# Patient Record
Sex: MALE | Race: AMERICAN INDIAN OR ALASKA NATIVE
[De-identification: names, ages, dates, MRNs, and addresses within clinical notes are randomized per-mention and may not be internally consistent; named-entity substitution may affect disease eponyms.]

---

## 2018-05-03 NOTE — EMERGENCY DEPARTMENT REPORT
Chief Complaint: Urogenital-Male


Stated Complaint: STD CHECK


Time Seen by Provider: 05/03/18 14:28





- HPI


History of Present Illness: 





Patient is a 27-year-old -American male who is presenting with exposure 

to STD.  Patient states his wife was diagnosed with chlamydia.  Patient has no 

symptoms himself at this time.  The patient just wanted to get a checkup.  





- ROS


Review of Systems: 





All systems negative except for those in HPI





- Exam


Vital Signs: 


 Vital Signs











  05/03/18





  13:45


 


Temperature 98.0 F


 


Pulse Rate 80


 


Respiratory 16





Rate 


 


Blood Pressure 125/73


 


O2 Sat by Pulse 99





Oximetry 











Physical Exam: 





Abdomen soft nontender


MSE screening note: 


Focused history and physical exam performed.


Due to findings the following was ordered:











ED Disposition for MSE


Clinical Impression: 


 STD exposure





Disposition: Z-07 MED SCREENING EXAM-LEFT


Condition: Stable


Referrals: 


PRIMARY CARE,MD [Primary Care Provider] - 3-5 Days

## 2019-01-23 ENCOUNTER — HOSPITAL ENCOUNTER (INPATIENT)
Dept: HOSPITAL 5 - ED | Age: 29
LOS: 7 days | Discharge: HOME | DRG: 74 | End: 2019-01-30
Attending: INTERNAL MEDICINE | Admitting: INTERNAL MEDICINE
Payer: SELF-PAY

## 2019-01-23 DIAGNOSIS — Z79.4: ICD-10-CM

## 2019-01-23 DIAGNOSIS — Z71.89: ICD-10-CM

## 2019-01-23 DIAGNOSIS — Z83.3: ICD-10-CM

## 2019-01-23 DIAGNOSIS — J45.909: ICD-10-CM

## 2019-01-23 DIAGNOSIS — E10.10: ICD-10-CM

## 2019-01-23 DIAGNOSIS — Z88.8: ICD-10-CM

## 2019-01-23 DIAGNOSIS — Z76.5: ICD-10-CM

## 2019-01-23 DIAGNOSIS — E10.43: Primary | ICD-10-CM

## 2019-01-23 DIAGNOSIS — K31.84: ICD-10-CM

## 2019-01-23 LAB
ALBUMIN SERPL-MCNC: 4.9 G/DL (ref 3.9–5)
ALT SERPL-CCNC: 19 UNITS/L (ref 7–56)
BASOPHILS # (AUTO): 0 K/MM3 (ref 0–0.1)
BASOPHILS NFR BLD AUTO: 0.2 % (ref 0–1.8)
BILIRUB DIRECT SERPL-MCNC: 0.2 MG/DL (ref 0–0.2)
BILIRUB UR QL STRIP: (no result)
BLOOD UR QL VISUAL: (no result)
BUN SERPL-MCNC: 15 MG/DL (ref 9–20)
BUN SERPL-MCNC: 16 MG/DL (ref 9–20)
BUN SERPL-MCNC: 16 MG/DL (ref 9–20)
BUN SERPL-MCNC: 17 MG/DL (ref 9–20)
BUN/CREAT SERPL: 12 %
BUN/CREAT SERPL: 12 %
BUN/CREAT SERPL: 13 %
BUN/CREAT SERPL: 13 %
CALCIUM SERPL-MCNC: 10 MG/DL (ref 8.4–10.2)
CALCIUM SERPL-MCNC: 9.3 MG/DL (ref 8.4–10.2)
CALCIUM SERPL-MCNC: 9.3 MG/DL (ref 8.4–10.2)
CALCIUM SERPL-MCNC: 9.4 MG/DL (ref 8.4–10.2)
EOSINOPHIL # BLD AUTO: 0 K/MM3 (ref 0–0.4)
EOSINOPHIL NFR BLD AUTO: 0 % (ref 0–4.3)
HCT VFR BLD CALC: 45.7 % (ref 35.5–45.6)
HEMOLYSIS INDEX: 22
HEMOLYSIS INDEX: 25
HEMOLYSIS INDEX: 5
HEMOLYSIS INDEX: 6
HGB BLD-MCNC: 15 GM/DL (ref 11.8–15.2)
LYMPHOCYTES # BLD AUTO: 0.8 K/MM3 (ref 1.2–5.4)
LYMPHOCYTES NFR BLD AUTO: 9.1 % (ref 13.4–35)
MCHC RBC AUTO-ENTMCNC: 33 % (ref 32–34)
MCV RBC AUTO: 92 FL (ref 84–94)
MONOCYTES # (AUTO): 0.5 K/MM3 (ref 0–0.8)
MONOCYTES % (AUTO): 5.8 % (ref 0–7.3)
MUCOUS THREADS #/AREA URNS HPF: (no result) /HPF
PH UR STRIP: 5 [PH] (ref 5–7)
PLATELET # BLD: 243 K/MM3 (ref 140–440)
PROT UR STRIP-MCNC: (no result) MG/DL
RBC # BLD AUTO: 4.99 M/MM3 (ref 3.65–5.03)
RBC #/AREA URNS HPF: 3 /HPF (ref 0–6)
UROBILINOGEN UR-MCNC: < 2 MG/DL (ref ?–2)
WBC #/AREA URNS HPF: 2 /HPF (ref 0–6)

## 2019-01-23 PROCEDURE — 80053 COMPREHEN METABOLIC PANEL: CPT

## 2019-01-23 PROCEDURE — 83735 ASSAY OF MAGNESIUM: CPT

## 2019-01-23 PROCEDURE — 81001 URINALYSIS AUTO W/SCOPE: CPT

## 2019-01-23 PROCEDURE — 84132 ASSAY OF SERUM POTASSIUM: CPT

## 2019-01-23 PROCEDURE — C9113 INJ PANTOPRAZOLE SODIUM, VIA: HCPCS

## 2019-01-23 PROCEDURE — 82962 GLUCOSE BLOOD TEST: CPT

## 2019-01-23 PROCEDURE — 85025 COMPLETE CBC W/AUTO DIFF WBC: CPT

## 2019-01-23 PROCEDURE — 80076 HEPATIC FUNCTION PANEL: CPT

## 2019-01-23 PROCEDURE — 82805 BLOOD GASES W/O2 SATURATION: CPT

## 2019-01-23 PROCEDURE — 71045 X-RAY EXAM CHEST 1 VIEW: CPT

## 2019-01-23 PROCEDURE — 78264 GASTRIC EMPTYING IMG STUDY: CPT

## 2019-01-23 PROCEDURE — 80048 BASIC METABOLIC PNL TOTAL CA: CPT

## 2019-01-23 PROCEDURE — 36415 COLL VENOUS BLD VENIPUNCTURE: CPT

## 2019-01-23 PROCEDURE — 74018 RADEX ABDOMEN 1 VIEW: CPT

## 2019-01-23 PROCEDURE — 84100 ASSAY OF PHOSPHORUS: CPT

## 2019-01-23 PROCEDURE — 83036 HEMOGLOBIN GLYCOSYLATED A1C: CPT

## 2019-01-23 PROCEDURE — A9541 TC99M SULFUR COLLOID: HCPCS

## 2019-01-23 RX ADMIN — ENOXAPARIN SODIUM SCH MG: 100 INJECTION SUBCUTANEOUS at 21:49

## 2019-01-23 RX ADMIN — ONDANSETRON PRN MG: 2 INJECTION INTRAMUSCULAR; INTRAVENOUS at 18:18

## 2019-01-23 RX ADMIN — INSULIN HUMAN SCH MLS/HR: 100 INJECTION, SOLUTION PARENTERAL at 22:20

## 2019-01-23 RX ADMIN — INSULIN HUMAN SCH MLS/HR: 100 INJECTION, SOLUTION PARENTERAL at 11:14

## 2019-01-23 RX ADMIN — DEXTROSE AND SODIUM CHLORIDE SCH MLS/HR: 5; .45 INJECTION, SOLUTION INTRAVENOUS at 18:59

## 2019-01-23 NOTE — HISTORY AND PHYSICAL REPORT
History of Present Illness


Date of admission: 


01/23/19 09:48





Chief complaint: 





High blood glucose


History of present illness: 





28-year-old man with history of type 1 diabetes.  He presents with nausea 

vomiting and elevated glucose.  not able to tolerate anything by mouth, 

therefore unable to take his insulin, has not able to stay hydrated with water 

as he is nauseous.  He denies nonadherence to his medications, he states that he

takes his insulins regularly.  He denies dysuria, cough, fevers or chills





Past Medical History: diabetes


Past Surgical History: No surgical history


Social history: lives with family.  denies: smoking, alcohol abuse


Family history: diabetes





Medications and Allergies


                                    Allergies











Allergy/AdvReac Type Severity Reaction Status Date / Time


 


metoclopramide [From Reglan] AdvReac  panic Verified 01/23/19 11:53





   attack  











                                Home Medications











 Medication  Instructions  Recorded  Confirmed  Last Taken  Type


 


Insulin Glargine,Hum.rec.anlog 20 units SUB-Q HS 01/23/19 01/23/19 Unknown 

History





[Lantus]     


 


Insulin Lispro [HumaLOG VIAL] 10 units SUB-Q AC 01/23/19 01/23/19 Unknown 

History











Active Meds: 


Active Medications





Dextrose (D50w (25gm) Syringe)  0 ml IV PRN PRN


   PRN Reason: Hypoglycemia


Insulin Human Regular 100 (units/ Sodium Chloride)  100 mls @ 1 mls/hr IV TITR 

JADEN; Protocol


   Last Admin: 01/23/19 11:14 Dose:  7 units/hr, 7 mls/hr


   Documented by: 











Review of Systems


All systems: negative


Constitutional: fatigue


Eyes: bilateral: blurred vision


Ears, nose, mouth and throat: no ear pain


Cardiovascular: no chest pain


Respiratory: no cough


Gastrointestinal: nausea, vomiting


Genitourinary Male: no dysuria


Rectal: no pain


Musculoskeletal: no neck stiffness


Integumentary: no rash


Neurological: no head injury


Psychiatric: no anxiety


Endocrine: no cold intolerance


Hematologic/Lymphatic: no easy bruising


Allergic/Immunologic: no urticaria





Exam





- Constitutional


Vitals: 


                                        











Temp Pulse Resp BP Pulse Ox


 


 98 F   100 H  22   109/52   98 


 


 01/23/19 11:00  01/23/19 11:30  01/23/19 11:30  01/23/19 11:30  01/23/19 11:30











General appearance: Present: mild distress





- EENT


Eyes: Present: PERRL


ENT: hearing intact, clear oral mucosa (dry)





- Neck


Neck: Present: supple, normal ROM





- Respiratory


Respiratory effort: normal


Respiratory: bilateral: CTA





- Cardiovascular


Heart Sounds: Present: S1 & S2.  Absent: rub, click





- Extremities


Extremities: pulses symmetrical, No edema


Peripheral Pulses: within normal limits





- Abdominal


General gastrointestinal: Present: soft, non-tender, non-distended, normal bowel

sounds


Male genitourinary: Present: normal





- Integumentary


Integumentary: Present: clear, warm, dry





- Musculoskeletal


Musculoskeletal: gait normal, strength equal bilaterally





- Psychiatric


Psychiatric: appropriate mood/affect, intact judgment & insight





- Neurologic


Neurologic: CNII-XII intact, moves all extremities





Results





- Labs


CBC & Chem 7: 


                                 01/23/19 08:58





                                 01/24/19 09:42


Labs: 


                             Laboratory Last Values











WBC  9.2 K/mm3 (4.5-11.0)   01/23/19  08:58    


 


RBC  4.99 M/mm3 (3.65-5.03)   01/23/19  08:58    


 


Hgb  15.0 gm/dl (11.8-15.2)   01/23/19  08:58    


 


Hct  45.7 % (35.5-45.6)  H  01/23/19  08:58    


 


MCV  92 fl (84-94)   01/23/19  08:58    


 


MCH  30 pg (28-32)   01/23/19  08:58    


 


MCHC  33 % (32-34)   01/23/19  08:58    


 


RDW  12.5 % (13.2-15.2)  L  01/23/19  08:58    


 


Plt Count  243 K/mm3 (140-440)   01/23/19  08:58    


 


Lymph % (Auto)  9.1 % (13.4-35.0)  L  01/23/19  08:58    


 


Mono % (Auto)  5.8 % (0.0-7.3)   01/23/19  08:58    


 


Eos % (Auto)  0.0 % (0.0-4.3)   01/23/19  08:58    


 


Baso % (Auto)  0.2 % (0.0-1.8)   01/23/19  08:58    


 


Lymph #  0.8 K/mm3 (1.2-5.4)  L  01/23/19  08:58    


 


Mono #  0.5 K/mm3 (0.0-0.8)   01/23/19  08:58    


 


Eos #  0.0 K/mm3 (0.0-0.4)   01/23/19  08:58    


 


Baso #  0.0 K/mm3 (0.0-0.1)   01/23/19  08:58    


 


Seg Neutrophils %  84.9 % (40.0-70.0)  H  01/23/19  08:58    


 


Seg Neutrophils #  7.8 K/mm3 (1.8-7.7)  H  01/23/19  08:58    


 


VBG pH  7.235  (7.320-7.420)  L  01/23/19  08:58    


 


Sodium  134 mmol/L (137-145)  L  01/23/19  08:58    


 


Potassium  4.7 mmol/L (3.6-5.0)   01/23/19  08:58    


 


Chloride  90.2 mmol/L ()  L  01/23/19  08:58    


 


Carbon Dioxide  12 mmol/L (22-30)  L  01/23/19  08:58    


 


Anion Gap  37 mmol/L  01/23/19  08:58    


 


BUN  15 mg/dL (9-20)   01/23/19  08:58    


 


Creatinine  1.3 mg/dL (0.8-1.5)   01/23/19  08:58    


 


Estimated GFR  > 60 ml/min  01/23/19  08:58    


 


BUN/Creatinine Ratio  12 %  01/23/19  08:58    


 


Glucose  471 mg/dL ()  H  01/23/19  08:58    


 


POC Glucose  393  ()  H  01/23/19  10:51    


 


Calcium  10.0 mg/dL (8.4-10.2)   01/23/19  08:58    


 


Phosphorus  5.20 mg/dL (2.5-4.5)  H  01/23/19  08:58    


 


Magnesium  1.70 mg/dL (1.7-2.3)   01/23/19  08:58    


 


Total Bilirubin  0.70 mg/dL (0.1-1.2)   01/23/19  08:58    


 


Direct Bilirubin  0.2 mg/dL (0-0.2)   01/23/19  08:58    


 


Indirect Bilirubin  0.5 mg/dL  01/23/19  08:58    


 


AST  14 units/L (5-40)   01/23/19  08:58    


 


ALT  19 units/L (7-56)   01/23/19  08:58    


 


Alkaline Phosphatase  86 units/L ()   01/23/19  08:58    


 


Total Protein  8.4 g/dL (6.3-8.2)  H  01/23/19  08:58    


 


Albumin  4.9 g/dL (3.9-5)   01/23/19  08:58    


 


Albumin/Globulin Ratio  1.4 %  01/23/19  08:58    


 


Urine Color  Straw  (Yellow)   01/23/19  10:55    


 


Urine Turbidity  Clear  (Clear)   01/23/19  10:55    


 


Urine pH  5.0  (5.0-7.0)   01/23/19  10:55    


 


Ur Specific Gravity  1.024  (1.003-1.030)   01/23/19  10:55    


 


Urine Protein  <15 mg/dl mg/dL (Negative)   01/23/19  10:55    


 


Urine Glucose (UA)  >=500 mg/dL (Negative)   01/23/19  10:55    


 


Urine Ketones  80 mg/dL (Negative)   01/23/19  10:55    


 


Urine Blood  Neg  (Negative)   01/23/19  10:55    


 


Urine Nitrite  Neg  (Negative)   01/23/19  10:55    


 


Urine Bilirubin  Neg  (Negative)   01/23/19  10:55    


 


Urine Urobilinogen  < 2.0 mg/dL (<2.0)   01/23/19  10:55    


 


Ur Leukocyte Esterase  Neg  (Negative)   01/23/19  10:55    


 


Urine WBC (Auto)  2.0 /HPF (0.0-6.0)   01/23/19  10:55    


 


Urine RBC (Auto)  3.0 /HPF (0.0-6.0)   01/23/19  10:55    


 


Urine Mucus  Few /HPF  01/23/19  10:55    














- Imaging and Cardiology


Chest x-ray: image reviewed (no acute findings)





Assessment and Plan


Assessment and plan: 





28-year-old man with type 1 diabetes, presents with nausea vomiting and elevated

glucose





Problems


DKA


Type 1 diabetes


Intractable nausea and vomiting





Plan


Admit to ICU, insulin drip, IV fluids


-Antiemetics as needed, obtain A1c


-DVT prophylaxis chemical





Critical care time 35 minutes

## 2019-01-23 NOTE — XRAY REPORT
AP CHEST :01/23/19 08:39:00



CLINICAL: DKA



COMPARISON:05/31/15



FINDINGS: Normal heart and pulmonary vasculature.  The lungs are mildly 

hyperinflated.  No airspace disease or pleural effusion.  The bones and 

soft tissues are normal.No tubes or lines.



IMPRESSION: Pulmonary hyperinflation and otherwise normal.  No 

pneumonia.

## 2019-01-23 NOTE — EMERGENCY DEPARTMENT REPORT
ED General Adult HPI





- General


Chief complaint: Nausea/Vomiting/Diarrhea


Stated complaint: HIGH BLOOD SUGAR


Time Seen by Provider: 01/23/19 09:03


Source: patient


Mode of arrival: Ambulatory


Limitations: No Limitations





- History of Present Illness


Initial comments: 





Patient is 28 years old male with history of type 1 diabetes on insulin.  

Patient is noncompliant with his medication.  Patient presented to the emergency

room complaining of nausea vomiting for the last 3 days.  Patient denied any 

chest pain, abdominal pain, diarrhea or urinary symptoms.





- Related Data


                                Home Medications











 Medication  Instructions  Recorded  Confirmed  Last Taken


 


Insulin Glargine,Hum.rec.anlog 20 units SUB-Q HS 01/23/19 01/23/19 Unknown





[Lantus]    


 


Insulin Lispro [HumaLOG VIAL] 10 units SUB-Q AC 01/23/19 01/23/19 Unknown











                                    Allergies











Allergy/AdvReac Type Severity Reaction Status Date / Time


 


metoclopramide [From Reglan] AdvReac  panic Verified 01/23/19 11:53





   attack  














ED Review of Systems


ROS: 


Stated complaint: HIGH BLOOD SUGAR


Other details as noted in HPI





Comment: All other systems reviewed and negative


Constitutional: denies: chills, fever


Respiratory: denies: cough, orthopnea


Cardiovascular: denies: chest pain, palpitations, dyspnea on exertion


Gastrointestinal: nausea, vomiting.  denies: abdominal pain, diarrhea, cons

tipation, hematemesis, melena, hematochezia


Musculoskeletal: denies: back pain


Neurological: denies: headache, weakness, numbness, paresthesias, confusion, 

abnormal gait





ED Past Medical Hx





- Past Medical History


Hx Diabetes: Yes


Hx Asthma: Yes





- Social History


Smoking Status: Never Smoker


Substance Use Type: None





- Medications


Home Medications: 


                                Home Medications











 Medication  Instructions  Recorded  Confirmed  Last Taken  Type


 


Insulin Glargine,Hum.rec.anlog 20 units SUB-Q HS 01/23/19 01/23/19 Unknown 

History





[Lantus]     


 


Insulin Lispro [HumaLOG VIAL] 10 units SUB-Q AC 01/23/19 01/23/19 Unknown 

History














ED Physical Exam





- General


Limitations: No Limitations


General appearance: alert, in no apparent distress





- Head


Head exam: Present: atraumatic, normocephalic, normal inspection





- Eye


Eye exam: Present: normal appearance





- ENT


ENT exam: Present: mucous membranes dry





- Neck


Neck exam: Present: normal inspection, full ROM.  Absent: tenderness, 

meningismus, lymphadenopathy, thyromegaly





- Respiratory


Respiratory exam: Present: normal lung sounds bilaterally





- Cardiovascular


Cardiovascular Exam: Present: regular rate, normal rhythm, normal heart sounds





- GI/Abdominal


GI/Abdominal exam: Present: soft, normal bowel sounds.  Absent: distended, 

tenderness, guarding, rebound, rigid, organomegaly, mass, bruit, pulsatile mass,

hernia





- Extremities Exam


Extremities exam: Present: normal inspection, full ROM, normal capillary refill.

 Absent: pedal edema, calf tenderness





- Back Exam


Back exam: Present: normal inspection, full ROM.  Absent: tenderness, CVA 

tenderness (R), CVA tenderness (L), muscle spasm, paraspinal tenderness, 

vertebral tenderness





- Neurological Exam


Neurological exam: Present: alert, oriented X3, CN II-XII intact, normal gait, 

reflexes normal





- Skin


Skin exam: Present: warm, intact, normal color





ED Course


                                   Vital Signs











  01/23/19 01/23/19 01/23/19





  08:48 09:05 09:15


 


Temperature 97.3 F L  


 


Pulse Rate 101 H  95 H


 


Respiratory 18  17





Rate   


 


Blood Pressure 127/83  118/63


 


O2 Sat by Pulse 100 97 





Oximetry   














  01/23/19 01/23/19 01/23/19





  09:27 09:30 09:45


 


Temperature 97.5 F L  


 


Pulse Rate  92 H 97 H


 


Respiratory  16 14





Rate   


 


Blood Pressure  126/66 103/57


 


O2 Sat by Pulse  97 77 L





Oximetry   














  01/23/19 01/23/19 01/23/19





  10:00 10:15 10:30


 


Temperature   


 


Pulse Rate 94 H 99 H 100 H


 


Respiratory 14 21 19





Rate   


 


Blood Pressure 115/64 109/52 117/59


 


O2 Sat by Pulse 100 100 100





Oximetry   














  01/23/19 01/23/19 01/23/19





  10:45 11:00 11:15


 


Temperature  98 F 


 


Pulse Rate 103 H 104 H 107 H


 


Respiratory 16 16 15





Rate   


 


Blood Pressure 107/54 108/51 103/48


 


O2 Sat by Pulse 99 99 97





Oximetry   














  01/23/19 01/23/19 01/23/19





  11:30 11:50 12:00


 


Temperature   


 


Pulse Rate 100 H 95 H 90


 


Respiratory 22 19 16





Rate   


 


Blood Pressure 109/52 97/53 97/53


 


O2 Sat by Pulse 98 98 100





Oximetry   














  01/23/19 01/23/19 01/23/19





  12:10 12:20 12:30


 


Temperature   


 


Pulse Rate 116 H 95 H 98 H


 


Respiratory 19 20 15





Rate   


 


Blood Pressure 130/66 130/66 130/66


 


O2 Sat by Pulse 98 99 99





Oximetry   














ED Medical Decision Making





- Lab Data


Result diagrams: 


                                 01/23/19 08:58





                                 01/23/19 13:44





- Medical Decision Making





Patient is 28 years old male with history of type 1 diabetes on insulin.  

Patient is noncompliant with his medication.  Patient presented to the emergency

room complaining of nausea vomiting for the last 3 days.  Patient denied any 

chest pain, abdominal pain, diarrhea or urinary symptoms.





Patient found to be in a DKA with anion gap of  37.  Patient received normal 

saline and started on insulin drip.  I discussed the patient was .  He

agreed to admit the patient to medical service.


Critical Care Time: Yes


Critical care time in (mins) excluding proc time.: 30


Critical care attestation.: 


If time is entered above; I have spent that time in minutes in the direct care 

of this critically ill patient, excluding procedure time.








ED Disposition


Clinical Impression: 


 DKA (diabetic ketoacidoses)





Disposition: DC-09 OP ADMIT IP TO THIS HOSP


Is pt being admited?: Yes


Condition: Stable

## 2019-01-24 LAB
ALBUMIN SERPL-MCNC: 3.8 G/DL (ref 3.9–5)
ALT SERPL-CCNC: 14 UNITS/L (ref 7–56)
BUN SERPL-MCNC: 11 MG/DL (ref 9–20)
BUN SERPL-MCNC: 11 MG/DL (ref 9–20)
BUN SERPL-MCNC: 12 MG/DL (ref 9–20)
BUN SERPL-MCNC: 13 MG/DL (ref 9–20)
BUN/CREAT SERPL: 10 %
BUN/CREAT SERPL: 11 %
BUN/CREAT SERPL: 12 %
BUN/CREAT SERPL: 9 %
CALCIUM SERPL-MCNC: 8.6 MG/DL (ref 8.4–10.2)
CALCIUM SERPL-MCNC: 9.5 MG/DL (ref 8.4–10.2)
CALCIUM SERPL-MCNC: 9.7 MG/DL (ref 8.4–10.2)
CALCIUM SERPL-MCNC: 9.8 MG/DL (ref 8.4–10.2)
HEMOLYSIS INDEX: 15
HEMOLYSIS INDEX: 15
HEMOLYSIS INDEX: 5
HEMOLYSIS INDEX: 6

## 2019-01-24 RX ADMIN — Medication SCH ML: at 09:54

## 2019-01-24 RX ADMIN — DEXTROSE AND SODIUM CHLORIDE SCH MLS/HR: 5; .45 INJECTION, SOLUTION INTRAVENOUS at 01:28

## 2019-01-24 RX ADMIN — Medication SCH: at 09:55

## 2019-01-24 RX ADMIN — ONDANSETRON PRN MG: 2 INJECTION INTRAMUSCULAR; INTRAVENOUS at 01:35

## 2019-01-24 RX ADMIN — Medication SCH ML: at 21:49

## 2019-01-24 RX ADMIN — ONDANSETRON PRN MG: 2 INJECTION INTRAMUSCULAR; INTRAVENOUS at 10:16

## 2019-01-24 RX ADMIN — ENOXAPARIN SODIUM SCH MG: 100 INJECTION SUBCUTANEOUS at 21:46

## 2019-01-24 RX ADMIN — POTASSIUM & SODIUM PHOSPHATES POWDER PACK 280-160-250 MG SCH EACH: 280-160-250 PACK at 21:46

## 2019-01-24 RX ADMIN — ONDANSETRON PRN MG: 2 INJECTION INTRAMUSCULAR; INTRAVENOUS at 21:52

## 2019-01-24 NOTE — EVENT NOTE
Date: 01/24/19





ICU admission requested for DKA


Now off IV insulin and transitioned to long acting formulation


No issues otherwise





A&P:


- transfer to medical floor

## 2019-01-24 NOTE — PROGRESS NOTE
Assessment and Plan


Assessment and plan: 





28-year-old man with type 1 diabetes, presents with nausea vomiting and elevated

glucose





Problems


DKA


Type 1 diabetes


Intractable nausea and vomiting





Plan


Admit to ICU, insulin drip, IV fluids; transition to sq insulins


-Antiemetics as needed, a1c 10.4


-DVT prophylaxis chemical


- patient claiming he takes clonipin for PTSD, checked  [facility] profile, is not 

on any controlled substances, called his PCP left a message with the 

coordinator, waiting to hear back





Critical care time 35 minutes





History


Interval history: 





Review of systems


Constitutional: No fevers, no malaise, no joint pains





CVS: No chest pain, no orthopnea, no dyspnea on exertion, no pedal edema





GI: c/o nausea and vomiting





Respiratory: No shortness of breath, no wheezing, no coughing





Hospitalist Physical





- Physical exam


Narrative exam: 





General.: Appears well, no distress, nontoxic


HEENT: Moist mucous membranes, extraocular muscles intact, no lymphadenopathy


Neck: supple


Cardiac: S1-S2 heard


Lungs: clear to auscultation bilaterally


Abdomen: soft , nontender,  nondistended,  bowel sounds positive


Extremities: no edema clubbing or cyanosis


Skin: no rash or lesions


Neurologic: no gross focal deficits


Psych: calm, and cooperative





- Constitutional


Vitals: 


                                        











Temp Pulse Resp BP Pulse Ox


 


 97 F L  91 H  15   94/55   98 


 


 01/24/19 08:00  01/24/19 09:10  01/24/19 09:10  01/24/19 09:10  01/24/19 09:10











General appearance: Present: mild distress





Results





- Labs


CBC & Chem 7: 


                                 01/23/19 08:58





                                 01/24/19 09:42


Labs: 


                             Laboratory Last Values











WBC  9.2 K/mm3 (4.5-11.0)   01/23/19  08:58    


 


RBC  4.99 M/mm3 (3.65-5.03)   01/23/19  08:58    


 


Hgb  15.0 gm/dl (11.8-15.2)   01/23/19  08:58    


 


Hct  45.7 % (35.5-45.6)  H  01/23/19  08:58    


 


MCV  92 fl (84-94)   01/23/19  08:58    


 


MCH  30 pg (28-32)   01/23/19  08:58    


 


MCHC  33 % (32-34)   01/23/19  08:58    


 


RDW  12.5 % (13.2-15.2)  L  01/23/19  08:58    


 


Plt Count  243 K/mm3 (140-440)   01/23/19  08:58    


 


Lymph % (Auto)  9.1 % (13.4-35.0)  L  01/23/19  08:58    


 


Mono % (Auto)  5.8 % (0.0-7.3)   01/23/19  08:58    


 


Eos % (Auto)  0.0 % (0.0-4.3)   01/23/19  08:58    


 


Baso % (Auto)  0.2 % (0.0-1.8)   01/23/19  08:58    


 


Lymph #  0.8 K/mm3 (1.2-5.4)  L  01/23/19  08:58    


 


Mono #  0.5 K/mm3 (0.0-0.8)   01/23/19  08:58    


 


Eos #  0.0 K/mm3 (0.0-0.4)   01/23/19  08:58    


 


Baso #  0.0 K/mm3 (0.0-0.1)   01/23/19  08:58    


 


Seg Neutrophils %  84.9 % (40.0-70.0)  H  01/23/19  08:58    


 


Seg Neutrophils #  7.8 K/mm3 (1.8-7.7)  H  01/23/19  08:58    


 


VBG pH  7.235  (7.320-7.420)  L  01/23/19  08:58    


 


Sodium  141 mmol/L (137-145)   01/24/19  09:42    


 


Potassium  4.1 mmol/L (3.6-5.0)   01/24/19  09:42    


 


Chloride  104.6 mmol/L ()   01/24/19  09:42    


 


Carbon Dioxide  19 mmol/L (22-30)  L  01/24/19  09:42    


 


Anion Gap  22 mmol/L  01/24/19  09:42    


 


BUN  11 mg/dL (9-20)   01/24/19  09:42    


 


Creatinine  1.1 mg/dL (0.8-1.5)   01/24/19  09:42    


 


Estimated GFR  > 60 ml/min  01/24/19  09:42    


 


BUN/Creatinine Ratio  10 %  01/24/19  09:42    


 


Glucose  139 mg/dL ()  H  01/24/19  09:42    


 


POC Glucose  124  ()  H  01/24/19  07:08    


 


Hemoglobin A1c  10.4 % (4-6)  H  01/24/19  01:51    


 


Calcium  9.5 mg/dL (8.4-10.2)   01/24/19  09:42    


 


Phosphorus  1.60 mg/dL (2.5-4.5)  L D 01/24/19  01:51    


 


Magnesium  1.80 mg/dL (1.7-2.3)   01/24/19  01:51    


 


Total Bilirubin  0.70 mg/dL (0.1-1.2)   01/23/19  08:58    


 


Direct Bilirubin  0.2 mg/dL (0-0.2)   01/23/19  08:58    


 


Indirect Bilirubin  0.5 mg/dL  01/23/19  08:58    


 


AST  14 units/L (5-40)   01/23/19  08:58    


 


ALT  19 units/L (7-56)   01/23/19  08:58    


 


Alkaline Phosphatase  86 units/L ()   01/23/19  08:58    


 


Total Protein  8.4 g/dL (6.3-8.2)  H  01/23/19  08:58    


 


Albumin  4.9 g/dL (3.9-5)   01/23/19  08:58    


 


Albumin/Globulin Ratio  1.4 %  01/23/19  08:58    


 


Urine Color  Straw  (Yellow)   01/23/19  10:55    


 


Urine Turbidity  Clear  (Clear)   01/23/19  10:55    


 


Urine pH  5.0  (5.0-7.0)   01/23/19  10:55    


 


Ur Specific Gravity  1.024  (1.003-1.030)   01/23/19  10:55    


 


Urine Protein  <15 mg/dl mg/dL (Negative)   01/23/19  10:55    


 


Urine Glucose (UA)  >=500 mg/dL (Negative)   01/23/19  10:55    


 


Urine Ketones  80 mg/dL (Negative)   01/23/19  10:55    


 


Urine Blood  Neg  (Negative)   01/23/19  10:55    


 


Urine Nitrite  Neg  (Negative)   01/23/19  10:55    


 


Urine Bilirubin  Neg  (Negative)   01/23/19  10:55    


 


Urine Urobilinogen  < 2.0 mg/dL (<2.0)   01/23/19  10:55    


 


Ur Leukocyte Esterase  Neg  (Negative)   01/23/19  10:55    


 


Urine WBC (Auto)  2.0 /HPF (0.0-6.0)   01/23/19  10:55    


 


Urine RBC (Auto)  3.0 /HPF (0.0-6.0)   01/23/19  10:55    


 


Urine Mucus  Few /HPF  01/23/19  10:55    














Nutrition/Malnutrition Assess





- Dietary Evaluation


Nutrition/Malnutrition Findings: 


                                        





Nutrition Notes                                            Start:  01/23/19 

15:17


Freq:                                                      Status: Active       




Protocol:                                                                       




 Document     01/23/19 15:18  KH  (Rec: 01/23/19 15:36    [id number])


 Co-Sign      01/23/19 15:18  OL


 Nutrition Notes


     Need for Assessment generated from:         MD Order


                                                 Education


     Initial or Follow up                        Brief Note


     Current Diagnosis                           Diabetes


     Other Pertinent Diagnosis                   DKA, DM type 1


     Current Diet                                NPO


     Subjective/Other Information                MD consult for diabetes


                                                 education. Pt. not appropriate


                                                 for education at this time d/


                                                 t pt. vomitting during visit.


                                                 Will continue to follow


 Nutrition Intervention


     Follow-Up By:                               01/24/19


     Additional Comments                         F/u for diabetes education

## 2019-01-25 RX ADMIN — Medication SCH ML: at 23:04

## 2019-01-25 RX ADMIN — ONDANSETRON PRN MG: 2 INJECTION INTRAMUSCULAR; INTRAVENOUS at 14:54

## 2019-01-25 RX ADMIN — PROMETHAZINE HYDROCHLORIDE PRN MG: 25 SUPPOSITORY RECTAL at 23:06

## 2019-01-25 RX ADMIN — Medication SCH ML: at 12:31

## 2019-01-25 RX ADMIN — POTASSIUM & SODIUM PHOSPHATES POWDER PACK 280-160-250 MG SCH EACH: 280-160-250 PACK at 09:48

## 2019-01-25 RX ADMIN — INSULIN GLARGINE SCH: 100 INJECTION, SOLUTION SUBCUTANEOUS at 02:32

## 2019-01-25 RX ADMIN — INSULIN GLARGINE SCH UNITS: 100 INJECTION, SOLUTION SUBCUTANEOUS at 22:25

## 2019-01-25 RX ADMIN — ENOXAPARIN SODIUM SCH MG: 100 INJECTION SUBCUTANEOUS at 22:21

## 2019-01-26 RX ADMIN — ENOXAPARIN SODIUM SCH: 100 INJECTION SUBCUTANEOUS at 22:34

## 2019-01-26 RX ADMIN — Medication SCH ML: at 11:07

## 2019-01-26 RX ADMIN — Medication SCH ML: at 22:34

## 2019-01-26 RX ADMIN — ONDANSETRON PRN MG: 2 INJECTION INTRAMUSCULAR; INTRAVENOUS at 20:49

## 2019-01-26 RX ADMIN — SODIUM CHLORIDE SCH MLS/HR: 0.9 INJECTION, SOLUTION INTRAVENOUS at 20:49

## 2019-01-26 RX ADMIN — PROMETHAZINE HYDROCHLORIDE PRN MG: 25 SUPPOSITORY RECTAL at 22:34

## 2019-01-26 RX ADMIN — PROMETHAZINE HYDROCHLORIDE PRN MG: 25 SUPPOSITORY RECTAL at 16:34

## 2019-01-26 RX ADMIN — INSULIN GLARGINE SCH UNITS: 100 INJECTION, SOLUTION SUBCUTANEOUS at 23:09

## 2019-01-26 RX ADMIN — ONDANSETRON PRN MG: 2 INJECTION INTRAMUSCULAR; INTRAVENOUS at 15:22

## 2019-01-26 NOTE — PROGRESS NOTE
Assessment and Plan


Assessment and plan: 





28-year-old man with type 1 diabetes, presents with nausea vomiting and elevated

glucose





Problems


DKA


Type 1 diabetes


Intractable nausea and vomiting


malingering, benzo seeking behavior





Plan


continue insulins, cont IVF


-Antiemetics as needed, a1c 10.4


-DVT prophylaxis chemical


- patient claiming he takes clonipin for PTSD, checked  [facility] profile, is not 

on any controlled substances, called his PCP and they do not have him on it 

either


-patient was counseled about rx drug dependence and risks of being on chronic 

benzos














History


Interval history: 





Review of systems


Constitutional: No fevers, no malaise, no joint pains





CVS: No chest pain, no orthopnea, no dyspnea on exertion, no pedal edema





GI: c/o nausea and vomiting





Respiratory: No shortness of breath, no wheezing, no coughing





Hospitalist Physical





- Physical exam


Narrative exam: 





General.: Appears well, no distress, nontoxic


HEENT: Moist mucous membranes, extraocular muscles intact, no lymphadenopathy


Neck: supple


Cardiac: S1-S2 heard


Lungs: clear to auscultation bilaterally


Abdomen: soft , nontender,  nondistended,  bowel sounds positive


Extremities: no edema clubbing or cyanosis


Skin: no rash or lesions


Neurologic: no gross focal deficits


Psych: calm, and cooperative





- Constitutional


Vitals: 


                                        











Temp Pulse Resp BP Pulse Ox


 


 98.3 F   91 H  24   120/73   99 


 


 01/26/19 16:53  01/26/19 16:53  01/26/19 16:53  01/26/19 16:53  01/26/19 16:53











General appearance: Present: mild distress





Results





- Labs


CBC & Chem 7: 


                                 01/23/19 08:58





                                 01/25/19 00:33


Labs: 


                             Laboratory Last Values











WBC  9.2 K/mm3 (4.5-11.0)   01/23/19  08:58    


 


RBC  4.99 M/mm3 (3.65-5.03)   01/23/19  08:58    


 


Hgb  15.0 gm/dl (11.8-15.2)   01/23/19  08:58    


 


Hct  45.7 % (35.5-45.6)  H  01/23/19  08:58    


 


MCV  92 fl (84-94)   01/23/19  08:58    


 


MCH  30 pg (28-32)   01/23/19  08:58    


 


MCHC  33 % (32-34)   01/23/19  08:58    


 


RDW  12.5 % (13.2-15.2)  L  01/23/19  08:58    


 


Plt Count  243 K/mm3 (140-440)   01/23/19  08:58    


 


Lymph % (Auto)  9.1 % (13.4-35.0)  L  01/23/19  08:58    


 


Mono % (Auto)  5.8 % (0.0-7.3)   01/23/19  08:58    


 


Eos % (Auto)  0.0 % (0.0-4.3)   01/23/19  08:58    


 


Baso % (Auto)  0.2 % (0.0-1.8)   01/23/19  08:58    


 


Lymph #  0.8 K/mm3 (1.2-5.4)  L  01/23/19  08:58    


 


Mono #  0.5 K/mm3 (0.0-0.8)   01/23/19  08:58    


 


Eos #  0.0 K/mm3 (0.0-0.4)   01/23/19  08:58    


 


Baso #  0.0 K/mm3 (0.0-0.1)   01/23/19  08:58    


 


Seg Neutrophils %  84.9 % (40.0-70.0)  H  01/23/19  08:58    


 


Seg Neutrophils #  7.8 K/mm3 (1.8-7.7)  H  01/23/19  08:58    


 


VBG pH  7.235  (7.320-7.420)  L  01/23/19  08:58    


 


Sodium  134 mmol/L (137-145)  L  01/24/19  17:10    


 


Potassium  4.3 mmol/L (3.6-5.0)   01/25/19  00:33    


 


Chloride  98.8 mmol/L ()   01/24/19  17:10    


 


Carbon Dioxide  10 mmol/L (22-30)  L D 01/24/19  17:10    


 


Anion Gap  29 mmol/L  01/24/19  17:10    


 


BUN  13 mg/dL (9-20)   01/24/19  17:10    


 


Creatinine  1.1 mg/dL (0.8-1.5)   01/24/19  17:10    


 


Estimated GFR  > 60 ml/min  01/24/19  17:10    


 


BUN/Creatinine Ratio  12 %  01/24/19  17:10    


 


Glucose  369 mg/dL ()  H  01/24/19  17:10    


 


POC Glucose  243  ()  H  01/26/19  16:27    


 


Hemoglobin A1c  10.4 % (4-6)  H  01/24/19  01:51    


 


Calcium  8.6 mg/dL (8.4-10.2)   01/24/19  17:10    


 


Phosphorus  2.80 mg/dL (2.5-4.5)  D 01/25/19  06:10    


 


Magnesium  1.80 mg/dL (1.7-2.3)   01/24/19  01:51    


 


Total Bilirubin  0.70 mg/dL (0.1-1.2)   01/24/19  17:10    


 


Direct Bilirubin  0.2 mg/dL (0-0.2)   01/23/19  08:58    


 


Indirect Bilirubin  0.5 mg/dL  01/23/19  08:58    


 


AST  12 units/L (5-40)   01/24/19  17:10    


 


ALT  14 units/L (7-56)   01/24/19  17:10    


 


Alkaline Phosphatase  68 units/L ()   01/24/19  17:10    


 


Total Protein  6.6 g/dL (6.3-8.2)  D 01/24/19  17:10    


 


Albumin  3.8 g/dL (3.9-5)  L  01/24/19  17:10    


 


Albumin/Globulin Ratio  1.4 %  01/24/19  17:10    


 


Urine Color  Straw  (Yellow)   01/23/19  10:55    


 


Urine Turbidity  Clear  (Clear)   01/23/19  10:55    


 


Urine pH  5.0  (5.0-7.0)   01/23/19  10:55    


 


Ur Specific Gravity  1.024  (1.003-1.030)   01/23/19  10:55    


 


Urine Protein  <15 mg/dl mg/dL (Negative)   01/23/19  10:55    


 


Urine Glucose (UA)  >=500 mg/dL (Negative)   01/23/19  10:55    


 


Urine Ketones  80 mg/dL (Negative)   01/23/19  10:55    


 


Urine Blood  Neg  (Negative)   01/23/19  10:55    


 


Urine Nitrite  Neg  (Negative)   01/23/19  10:55    


 


Urine Bilirubin  Neg  (Negative)   01/23/19  10:55    


 


Urine Urobilinogen  < 2.0 mg/dL (<2.0)   01/23/19  10:55    


 


Ur Leukocyte Esterase  Neg  (Negative)   01/23/19  10:55    


 


Urine WBC (Auto)  2.0 /HPF (0.0-6.0)   01/23/19  10:55    


 


Urine RBC (Auto)  3.0 /HPF (0.0-6.0)   01/23/19  10:55    


 


Urine Mucus  Few /HPF  01/23/19  10:55    














Nutrition/Malnutrition Assess





- Dietary Evaluation


Nutrition/Malnutrition Findings: 


                                        





Nutrition Notes                                            Start:  01/23/19 

15:17


Freq:                                                      Status: Active       




Protocol:                                                                       




 Document     01/26/19 16:41  RM  (Rec: 01/26/19 16:44  RM  [id number])


 Nutrition Notes


     Initial or Follow up                        Brief Note


     Subjective/Other Information                Pt requested writer come back


                                                 tomorrow. Writer explained


                                                 importance of diet education


                                                 in helping preventing another


                                                 occurance of DKA. Pt insisted


                                                 writer come back tomorrow.


 Nutrition Intervention


     Follow-Up By:                               01/27/19


     Additional Comments                         Follow diet education

## 2019-01-26 NOTE — PROGRESS NOTE
Assessment and Plan


Assessment and plan: 





28-year-old man with type 1 diabetes, presents with nausea vomiting and elevated

glucose





Problems


DKA


Type 1 diabetes


Intractable nausea and vomiting


malingering, benzo seeking behavior





Plan


continue insulins, cont IVF


-Antiemetics as needed, a1c 10.4


-DVT prophylaxis chemical


- patient claiming he takes clonipin for PTSD, checked  [facility] profile, is not 

on any controlled substances, called his PCP and they do not have him on it 

either


-patient was counseled about rx drug dependence and risks of being on chronic 

benzos














History


Interval history: 





Review of systems


Constitutional: No fevers, no malaise, no joint pains





CVS: No chest pain, no orthopnea, no dyspnea on exertion, no pedal edema





GI: c/o nausea and vomiting





Respiratory: No shortness of breath, no wheezing, no coughing





Hospitalist Physical





- Physical exam


Narrative exam: 





General.: Appears well, no distress, nontoxic


HEENT: Moist mucous membranes, extraocular muscles intact, no lymphadenopathy


Neck: supple


Cardiac: S1-S2 heard


Lungs: clear to auscultation bilaterally


Abdomen: soft , nontender,  nondistended,  bowel sounds positive


Extremities: no edema clubbing or cyanosis


Skin: no rash or lesions


Neurologic: no gross focal deficits


Psych: calm, and cooperative





- Constitutional


Vitals: 


                                        











Temp Pulse Resp BP Pulse Ox


 


 98.3 F   91 H  24   120/73   99 


 


 01/26/19 16:53  01/26/19 16:53  01/26/19 16:53  01/26/19 16:53  01/26/19 16:53











General appearance: Present: mild distress





Results





- Labs


CBC & Chem 7: 


                                 01/23/19 08:58





                                 01/25/19 00:33


Labs: 


                             Laboratory Last Values











WBC  9.2 K/mm3 (4.5-11.0)   01/23/19  08:58    


 


RBC  4.99 M/mm3 (3.65-5.03)   01/23/19  08:58    


 


Hgb  15.0 gm/dl (11.8-15.2)   01/23/19  08:58    


 


Hct  45.7 % (35.5-45.6)  H  01/23/19  08:58    


 


MCV  92 fl (84-94)   01/23/19  08:58    


 


MCH  30 pg (28-32)   01/23/19  08:58    


 


MCHC  33 % (32-34)   01/23/19  08:58    


 


RDW  12.5 % (13.2-15.2)  L  01/23/19  08:58    


 


Plt Count  243 K/mm3 (140-440)   01/23/19  08:58    


 


Lymph % (Auto)  9.1 % (13.4-35.0)  L  01/23/19  08:58    


 


Mono % (Auto)  5.8 % (0.0-7.3)   01/23/19  08:58    


 


Eos % (Auto)  0.0 % (0.0-4.3)   01/23/19  08:58    


 


Baso % (Auto)  0.2 % (0.0-1.8)   01/23/19  08:58    


 


Lymph #  0.8 K/mm3 (1.2-5.4)  L  01/23/19  08:58    


 


Mono #  0.5 K/mm3 (0.0-0.8)   01/23/19  08:58    


 


Eos #  0.0 K/mm3 (0.0-0.4)   01/23/19  08:58    


 


Baso #  0.0 K/mm3 (0.0-0.1)   01/23/19  08:58    


 


Seg Neutrophils %  84.9 % (40.0-70.0)  H  01/23/19  08:58    


 


Seg Neutrophils #  7.8 K/mm3 (1.8-7.7)  H  01/23/19  08:58    


 


VBG pH  7.235  (7.320-7.420)  L  01/23/19  08:58    


 


Sodium  134 mmol/L (137-145)  L  01/24/19  17:10    


 


Potassium  4.3 mmol/L (3.6-5.0)   01/25/19  00:33    


 


Chloride  98.8 mmol/L ()   01/24/19  17:10    


 


Carbon Dioxide  10 mmol/L (22-30)  L D 01/24/19  17:10    


 


Anion Gap  29 mmol/L  01/24/19  17:10    


 


BUN  13 mg/dL (9-20)   01/24/19  17:10    


 


Creatinine  1.1 mg/dL (0.8-1.5)   01/24/19  17:10    


 


Estimated GFR  > 60 ml/min  01/24/19  17:10    


 


BUN/Creatinine Ratio  12 %  01/24/19  17:10    


 


Glucose  369 mg/dL ()  H  01/24/19  17:10    


 


POC Glucose  243  ()  H  01/26/19  16:27    


 


Hemoglobin A1c  10.4 % (4-6)  H  01/24/19  01:51    


 


Calcium  8.6 mg/dL (8.4-10.2)   01/24/19  17:10    


 


Phosphorus  2.80 mg/dL (2.5-4.5)  D 01/25/19  06:10    


 


Magnesium  1.80 mg/dL (1.7-2.3)   01/24/19  01:51    


 


Total Bilirubin  0.70 mg/dL (0.1-1.2)   01/24/19  17:10    


 


Direct Bilirubin  0.2 mg/dL (0-0.2)   01/23/19  08:58    


 


Indirect Bilirubin  0.5 mg/dL  01/23/19  08:58    


 


AST  12 units/L (5-40)   01/24/19  17:10    


 


ALT  14 units/L (7-56)   01/24/19  17:10    


 


Alkaline Phosphatase  68 units/L ()   01/24/19  17:10    


 


Total Protein  6.6 g/dL (6.3-8.2)  D 01/24/19  17:10    


 


Albumin  3.8 g/dL (3.9-5)  L  01/24/19  17:10    


 


Albumin/Globulin Ratio  1.4 %  01/24/19  17:10    


 


Urine Color  Straw  (Yellow)   01/23/19  10:55    


 


Urine Turbidity  Clear  (Clear)   01/23/19  10:55    


 


Urine pH  5.0  (5.0-7.0)   01/23/19  10:55    


 


Ur Specific Gravity  1.024  (1.003-1.030)   01/23/19  10:55    


 


Urine Protein  <15 mg/dl mg/dL (Negative)   01/23/19  10:55    


 


Urine Glucose (UA)  >=500 mg/dL (Negative)   01/23/19  10:55    


 


Urine Ketones  80 mg/dL (Negative)   01/23/19  10:55    


 


Urine Blood  Neg  (Negative)   01/23/19  10:55    


 


Urine Nitrite  Neg  (Negative)   01/23/19  10:55    


 


Urine Bilirubin  Neg  (Negative)   01/23/19  10:55    


 


Urine Urobilinogen  < 2.0 mg/dL (<2.0)   01/23/19  10:55    


 


Ur Leukocyte Esterase  Neg  (Negative)   01/23/19  10:55    


 


Urine WBC (Auto)  2.0 /HPF (0.0-6.0)   01/23/19  10:55    


 


Urine RBC (Auto)  3.0 /HPF (0.0-6.0)   01/23/19  10:55    


 


Urine Mucus  Few /HPF  01/23/19  10:55    














Nutrition/Malnutrition Assess





- Dietary Evaluation


Nutrition/Malnutrition Findings: 


                                        





Nutrition Notes                                            Start:  01/23/19 

15:17


Freq:                                                      Status: Active       




Protocol:                                                                       




 Document     01/26/19 16:41  RM  (Rec: 01/26/19 16:44  RM  [id number])


 Nutrition Notes


     Initial or Follow up                        Brief Note


     Subjective/Other Information                Pt requested writer come back


                                                 tomorrow. Writer explained


                                                 importance of diet education


                                                 in helping preventing another


                                                 occurance of DKA. Pt insisted


                                                 writer come back tomorrow.


 Nutrition Intervention


     Follow-Up By:                               01/27/19


     Additional Comments                         Follow diet education

## 2019-01-26 NOTE — XRAY REPORT
FINAL REPORT



EXAM:  XR ABDOMEN 1V AP



HISTORY:  Abdominal pain 



TECHNIQUE:  Supine abdomen 



PRIORS:  None.



FINDINGS:  

Moderate amount of stool and gas present within the colon. No evidence of colonic or small bowel dila
tation. No signs of free air. No abnormal calcifications are identified. 



IMPRESSION:  

Nonobstructive bowel gas pattern. No acute abnormality seen.

## 2019-01-27 RX ADMIN — Medication SCH ML: at 21:46

## 2019-01-27 RX ADMIN — INSULIN GLARGINE SCH UNITS: 100 INJECTION, SOLUTION SUBCUTANEOUS at 21:42

## 2019-01-27 RX ADMIN — ENOXAPARIN SODIUM SCH: 100 INJECTION SUBCUTANEOUS at 21:46

## 2019-01-27 RX ADMIN — SODIUM CHLORIDE SCH MLS/HR: 0.9 INJECTION, SOLUTION INTRAVENOUS at 08:38

## 2019-01-27 RX ADMIN — Medication SCH ML: at 13:14

## 2019-01-27 RX ADMIN — SODIUM CHLORIDE SCH MLS/HR: 0.9 INJECTION, SOLUTION INTRAVENOUS at 21:40

## 2019-01-27 RX ADMIN — PROMETHAZINE HYDROCHLORIDE PRN MG: 25 SUPPOSITORY RECTAL at 20:33

## 2019-01-27 NOTE — PROGRESS NOTE
Assessment and Plan


Assessment and plan: 





28-year-old man with type 1 diabetes, presents with nausea vomiting and elevated

glucose





Problems


DKA


Type 1 diabetes


Intractable nausea and vomiting


malingering, benzo seeking behavior





Plan


continue insulins, cont IVF


-Antiemetics as needed, a1c 10.4, likely due to gastroparesis, obtain NM gastric

emptying study 


-DVT prophylaxis chemical


- patient claiming he takes clonipin for PTSD, checked  [facility] profile, is not 

on any controlled substances, called his PCP and they do not have him on it 

either


-patient was counseled about rx drug dependence and risks of being on chronic 

benzos














History


Interval history: 





Review of systems


Constitutional: No fevers, no malaise, no joint pains





CVS: No chest pain, no orthopnea, no dyspnea on exertion, no pedal edema





GI: c/o nausea and vomiting, RN states that he is not eating





Respiratory: No shortness of breath, no wheezing, no coughing





Hospitalist Physical





- Physical exam


Narrative exam: 





General.: Appears well, no distress, nontoxic


HEENT: Moist mucous membranes, extraocular muscles intact, no lymphadenopathy


Neck: supple


Cardiac: S1-S2 heard


Lungs: clear to auscultation bilaterally


Abdomen: soft , nontender,  nondistended,  bowel sounds positive


Extremities: no edema clubbing or cyanosis


Skin: no rash or lesions


Neurologic: no gross focal deficits


Psych: calm, and cooperative





- Constitutional


Vitals: 


                                        











Temp Pulse Resp BP Pulse Ox


 


 98.5 F   92 H  16   98/55   97 


 


 01/27/19 05:37  01/27/19 05:37  01/27/19 05:37  01/27/19 05:37  01/27/19 05:37











General appearance: Present: mild distress





Results





- Labs


CBC & Chem 7: 


                                 01/23/19 08:58





                                 01/25/19 00:33


Labs: 


                             Laboratory Last Values











WBC  9.2 K/mm3 (4.5-11.0)   01/23/19  08:58    


 


RBC  4.99 M/mm3 (3.65-5.03)   01/23/19  08:58    


 


Hgb  15.0 gm/dl (11.8-15.2)   01/23/19  08:58    


 


Hct  45.7 % (35.5-45.6)  H  01/23/19  08:58    


 


MCV  92 fl (84-94)   01/23/19  08:58    


 


MCH  30 pg (28-32)   01/23/19  08:58    


 


MCHC  33 % (32-34)   01/23/19  08:58    


 


RDW  12.5 % (13.2-15.2)  L  01/23/19  08:58    


 


Plt Count  243 K/mm3 (140-440)   01/23/19  08:58    


 


Lymph % (Auto)  9.1 % (13.4-35.0)  L  01/23/19  08:58    


 


Mono % (Auto)  5.8 % (0.0-7.3)   01/23/19  08:58    


 


Eos % (Auto)  0.0 % (0.0-4.3)   01/23/19  08:58    


 


Baso % (Auto)  0.2 % (0.0-1.8)   01/23/19  08:58    


 


Lymph #  0.8 K/mm3 (1.2-5.4)  L  01/23/19  08:58    


 


Mono #  0.5 K/mm3 (0.0-0.8)   01/23/19  08:58    


 


Eos #  0.0 K/mm3 (0.0-0.4)   01/23/19  08:58    


 


Baso #  0.0 K/mm3 (0.0-0.1)   01/23/19  08:58    


 


Seg Neutrophils %  84.9 % (40.0-70.0)  H  01/23/19  08:58    


 


Seg Neutrophils #  7.8 K/mm3 (1.8-7.7)  H  01/23/19  08:58    


 


VBG pH  7.235  (7.320-7.420)  L  01/23/19  08:58    


 


Sodium  134 mmol/L (137-145)  L  01/24/19  17:10    


 


Potassium  4.3 mmol/L (3.6-5.0)   01/25/19  00:33    


 


Chloride  98.8 mmol/L ()   01/24/19  17:10    


 


Carbon Dioxide  10 mmol/L (22-30)  L D 01/24/19  17:10    


 


Anion Gap  29 mmol/L  01/24/19  17:10    


 


BUN  13 mg/dL (9-20)   01/24/19  17:10    


 


Creatinine  1.1 mg/dL (0.8-1.5)   01/24/19  17:10    


 


Estimated GFR  > 60 ml/min  01/24/19  17:10    


 


BUN/Creatinine Ratio  12 %  01/24/19  17:10    


 


Glucose  369 mg/dL ()  H  01/24/19  17:10    


 


POC Glucose  208  ()  H  01/27/19  00:42    


 


Hemoglobin A1c  10.4 % (4-6)  H  01/24/19  01:51    


 


Calcium  8.6 mg/dL (8.4-10.2)   01/24/19  17:10    


 


Phosphorus  2.80 mg/dL (2.5-4.5)  D 01/25/19  06:10    


 


Magnesium  1.80 mg/dL (1.7-2.3)   01/24/19  01:51    


 


Total Bilirubin  0.70 mg/dL (0.1-1.2)   01/24/19  17:10    


 


Direct Bilirubin  0.2 mg/dL (0-0.2)   01/23/19  08:58    


 


Indirect Bilirubin  0.5 mg/dL  01/23/19  08:58    


 


AST  12 units/L (5-40)   01/24/19  17:10    


 


ALT  14 units/L (7-56)   01/24/19  17:10    


 


Alkaline Phosphatase  68 units/L ()   01/24/19  17:10    


 


Total Protein  6.6 g/dL (6.3-8.2)  D 01/24/19  17:10    


 


Albumin  3.8 g/dL (3.9-5)  L  01/24/19  17:10    


 


Albumin/Globulin Ratio  1.4 %  01/24/19  17:10    


 


Urine Color  Straw  (Yellow)   01/23/19  10:55    


 


Urine Turbidity  Clear  (Clear)   01/23/19  10:55    


 


Urine pH  5.0  (5.0-7.0)   01/23/19  10:55    


 


Ur Specific Gravity  1.024  (1.003-1.030)   01/23/19  10:55    


 


Urine Protein  <15 mg/dl mg/dL (Negative)   01/23/19  10:55    


 


Urine Glucose (UA)  >=500 mg/dL (Negative)   01/23/19  10:55    


 


Urine Ketones  80 mg/dL (Negative)   01/23/19  10:55    


 


Urine Blood  Neg  (Negative)   01/23/19  10:55    


 


Urine Nitrite  Neg  (Negative)   01/23/19  10:55    


 


Urine Bilirubin  Neg  (Negative)   01/23/19  10:55    


 


Urine Urobilinogen  < 2.0 mg/dL (<2.0)   01/23/19  10:55    


 


Ur Leukocyte Esterase  Neg  (Negative)   01/23/19  10:55    


 


Urine WBC (Auto)  2.0 /HPF (0.0-6.0)   01/23/19  10:55    


 


Urine RBC (Auto)  3.0 /HPF (0.0-6.0)   01/23/19  10:55    


 


Urine Mucus  Few /HPF  01/23/19  10:55    














Nutrition/Malnutrition Assess





- Dietary Evaluation


Nutrition/Malnutrition Findings: 


                                        





Nutrition Notes                                            Start:  01/23/19 

15:17


Freq:                                                      Status: Active       




Protocol:                                                                       




 Document     01/26/19 16:41  RM  (Rec: 01/26/19 16:44  RM  [id number])


 Nutrition Notes


     Initial or Follow up                        Brief Note


     Subjective/Other Information                Pt requested writer come back


                                                 tomorrow. Writer explained


                                                 importance of diet education


                                                 in helping preventing another


                                                 occurance of DKA. Pt insisted


                                                 writer come back tomorrow.


 Nutrition Intervention


     Follow-Up By:                               01/27/19


     Additional Comments                         Follow diet education

## 2019-01-28 RX ADMIN — ENOXAPARIN SODIUM SCH MG: 100 INJECTION SUBCUTANEOUS at 22:29

## 2019-01-28 RX ADMIN — ONDANSETRON PRN MG: 2 INJECTION INTRAMUSCULAR; INTRAVENOUS at 17:55

## 2019-01-28 RX ADMIN — Medication SCH ML: at 22:31

## 2019-01-28 RX ADMIN — SODIUM CHLORIDE SCH MLS/HR: 0.9 INJECTION, SOLUTION INTRAVENOUS at 09:06

## 2019-01-28 RX ADMIN — INSULIN GLARGINE SCH UNITS: 100 INJECTION, SOLUTION SUBCUTANEOUS at 22:30

## 2019-01-28 RX ADMIN — PROMETHAZINE HYDROCHLORIDE PRN MG: 25 SUPPOSITORY RECTAL at 22:30

## 2019-01-28 RX ADMIN — Medication SCH ML: at 09:08

## 2019-01-28 RX ADMIN — PANTOPRAZOLE SODIUM SCH MG: 40 INJECTION, POWDER, FOR SOLUTION INTRAVENOUS at 13:54

## 2019-01-28 RX ADMIN — SODIUM CHLORIDE SCH MLS/HR: 0.9 INJECTION, SOLUTION INTRAVENOUS at 17:55

## 2019-01-28 NOTE — PROGRESS NOTE
Assessment and Plan


Assessment and plan: 





28-year-old man with type 1 diabetes, presents with nausea vomiting and elevated

glucose





Problems


DKA


Type 1 diabetes


Intractable nausea and vomiting


malingering, benzo seeking behavior





Plan


continue insulins, cont IVF


-Antiemetics as needed, a1c 10.4,  due to gastroparesis,  NM gastric emptying 

study confirms it


-ADAT


-DVT prophylaxis chemical


- patient claiming he takes clonipin for PTSD, checked  [facility] profile, is not 

on any controlled substances, called his PCP and they do not have him on it 

either


-patient was counseled about rx drug dependence and risks of being on chronic 

benzos














History


Interval history: 





Review of systems


Constitutional: No fevers, no malaise, no joint pains





CVS: No chest pain, no orthopnea, no dyspnea on exertion, no pedal edema





GI: c/o nausea and vomiting, RN states that he is not eating





Respiratory: No shortness of breath, no wheezing, no coughing





Hospitalist Physical





- Physical exam


Narrative exam: 





General.: Appears well, no distress, nontoxic


HEENT: Moist mucous membranes, extraocular muscles intact, no lymphadenopathy


Neck: supple


Cardiac: S1-S2 heard


Lungs: clear to auscultation bilaterally


Abdomen: soft , nontender,  nondistended,  bowel sounds positive


Extremities: no edema clubbing or cyanosis


Skin: no rash or lesions


Neurologic: no gross focal deficits


Psych: calm, and cooperative





- Constitutional


Vitals: 


                                        











Temp Pulse Resp BP Pulse Ox


 


 98.4 F   84   20   113/67   78 L


 


 01/28/19 05:32  01/28/19 05:32  01/28/19 05:32  01/28/19 05:32  01/28/19 05:32











General appearance: Present: mild distress





Results





- Labs


CBC & Chem 7: 


                                 01/23/19 08:58





                                 01/25/19 00:33


Labs: 


                             Laboratory Last Values











WBC  9.2 K/mm3 (4.5-11.0)   01/23/19  08:58    


 


RBC  4.99 M/mm3 (3.65-5.03)   01/23/19  08:58    


 


Hgb  15.0 gm/dl (11.8-15.2)   01/23/19  08:58    


 


Hct  45.7 % (35.5-45.6)  H  01/23/19  08:58    


 


MCV  92 fl (84-94)   01/23/19  08:58    


 


MCH  30 pg (28-32)   01/23/19  08:58    


 


MCHC  33 % (32-34)   01/23/19  08:58    


 


RDW  12.5 % (13.2-15.2)  L  01/23/19  08:58    


 


Plt Count  243 K/mm3 (140-440)   01/23/19  08:58    


 


Lymph % (Auto)  9.1 % (13.4-35.0)  L  01/23/19  08:58    


 


Mono % (Auto)  5.8 % (0.0-7.3)   01/23/19  08:58    


 


Eos % (Auto)  0.0 % (0.0-4.3)   01/23/19  08:58    


 


Baso % (Auto)  0.2 % (0.0-1.8)   01/23/19  08:58    


 


Lymph #  0.8 K/mm3 (1.2-5.4)  L  01/23/19  08:58    


 


Mono #  0.5 K/mm3 (0.0-0.8)   01/23/19  08:58    


 


Eos #  0.0 K/mm3 (0.0-0.4)   01/23/19  08:58    


 


Baso #  0.0 K/mm3 (0.0-0.1)   01/23/19  08:58    


 


Seg Neutrophils %  84.9 % (40.0-70.0)  H  01/23/19  08:58    


 


Seg Neutrophils #  7.8 K/mm3 (1.8-7.7)  H  01/23/19  08:58    


 


VBG pH  7.235  (7.320-7.420)  L  01/23/19  08:58    


 


Sodium  134 mmol/L (137-145)  L  01/24/19  17:10    


 


Potassium  4.3 mmol/L (3.6-5.0)   01/25/19  00:33    


 


Chloride  98.8 mmol/L ()   01/24/19  17:10    


 


Carbon Dioxide  10 mmol/L (22-30)  L D 01/24/19  17:10    


 


Anion Gap  29 mmol/L  01/24/19  17:10    


 


BUN  13 mg/dL (9-20)   01/24/19  17:10    


 


Creatinine  1.1 mg/dL (0.8-1.5)   01/24/19  17:10    


 


Estimated GFR  > 60 ml/min  01/24/19  17:10    


 


BUN/Creatinine Ratio  12 %  01/24/19  17:10    


 


Glucose  369 mg/dL ()  H  01/24/19  17:10    


 


POC Glucose  202  ()  H  01/28/19  10:35    


 


Hemoglobin A1c  10.4 % (4-6)  H  01/24/19  01:51    


 


Calcium  8.6 mg/dL (8.4-10.2)   01/24/19  17:10    


 


Phosphorus  2.80 mg/dL (2.5-4.5)  D 01/25/19  06:10    


 


Magnesium  1.80 mg/dL (1.7-2.3)   01/24/19  01:51    


 


Total Bilirubin  0.70 mg/dL (0.1-1.2)   01/24/19  17:10    


 


Direct Bilirubin  0.2 mg/dL (0-0.2)   01/23/19  08:58    


 


Indirect Bilirubin  0.5 mg/dL  01/23/19  08:58    


 


AST  12 units/L (5-40)   01/24/19  17:10    


 


ALT  14 units/L (7-56)   01/24/19  17:10    


 


Alkaline Phosphatase  68 units/L ()   01/24/19  17:10    


 


Total Protein  6.6 g/dL (6.3-8.2)  D 01/24/19  17:10    


 


Albumin  3.8 g/dL (3.9-5)  L  01/24/19  17:10    


 


Albumin/Globulin Ratio  1.4 %  01/24/19  17:10    


 


Urine Color  Straw  (Yellow)   01/23/19  10:55    


 


Urine Turbidity  Clear  (Clear)   01/23/19  10:55    


 


Urine pH  5.0  (5.0-7.0)   01/23/19  10:55    


 


Ur Specific Gravity  1.024  (1.003-1.030)   01/23/19  10:55    


 


Urine Protein  <15 mg/dl mg/dL (Negative)   01/23/19  10:55    


 


Urine Glucose (UA)  >=500 mg/dL (Negative)   01/23/19  10:55    


 


Urine Ketones  80 mg/dL (Negative)   01/23/19  10:55    


 


Urine Blood  Neg  (Negative)   01/23/19  10:55    


 


Urine Nitrite  Neg  (Negative)   01/23/19  10:55    


 


Urine Bilirubin  Neg  (Negative)   01/23/19  10:55    


 


Urine Urobilinogen  < 2.0 mg/dL (<2.0)   01/23/19  10:55    


 


Ur Leukocyte Esterase  Neg  (Negative)   01/23/19  10:55    


 


Urine WBC (Auto)  2.0 /HPF (0.0-6.0)   01/23/19  10:55    


 


Urine RBC (Auto)  3.0 /HPF (0.0-6.0)   01/23/19  10:55    


 


Urine Mucus  Few /HPF  01/23/19  10:55    














Nutrition/Malnutrition Assess





- Dietary Evaluation


Nutrition/Malnutrition Findings: 


                                        





Nutrition Notes                                            Start:  01/23/19 

15:17


Freq:                                                      Status: Active       




Protocol:                                                                       




 Document     01/27/19 14:44  RM  (Rec: 01/27/19 14:48  RM  [id number])


 Nutrition Notes


     Initial or Follow up                        Assessment


     Current Diagnosis                           Diabetes


     Other Pertinent Diagnosis                   DKA, DM, N/V


     Current Diet                                Consistent CHO


     Labs/Tests                                  Reviewed


     Pertinent Medications                       Reviewed


     Height                                      6 ft 5 in


     Weight                                      83.5 kg


     Usual Body Weight                           88.64 kg


     Ideal Body Weight (kg)                      94.54


     BMI                                         21.8


     Subjective/Other Information                Pt stated that he has not


                                                 eaten since admission d/t


                                                 vomiting. Stated he cannot


                                                 keep down solid food. Admitted


                                                 to constipation. Stated UBW


                                                 is 195 lbs but cannot recall


                                                 when he saw that he weighed


                                                 that.


                                                 Pt stated that he had been


                                                 previously educated but could


                                                 not recall carbohydrate


                                                 counting. Reviewed


                                                 carbohydrate counting. Gave


                                                 handout.


     Percent of energy/protein needs met:        0%/0%


     Burn                                        Absent


     Trauma                                      Absent


     #2


      Nutrition Diagnosis                        Inadequate oral intake


      Etiology                                   N/V


      As Evidenced by Signs and Symptoms         pt statement that he has not


                                                 eaten since admission


     #1


      Nutrition Diagnosis                        Food and nutrition-related


                                                 knowledge deficit


      Etiology                                   inability to recall previous


                                                 education


      As Evidenced by Signs and Symptoms         pt desire for education


     Is patient on ventilator?                   No


     Is Patient Ambulatory and/or Out of Bed     Yes


     REE-([facility]-ambulatory/OOB) [     2505.594


      [email or web address]]                               


     Calculation Used for Recommendations        [facility]


     Additional Notes                            Protein Needs: 67-84g (0.8-1g/


                                                 kg)


                                                 Fluid Needs: 1 ml/kcal


 Nutrition Intervention


     Change Diet Order:                          Clear liquid


     Add Supplement/Snack (indicate name/kcal    Ensure Clear 1 daily


      /protein )                                 


     Provides kCal:                              240


     Provides Protein (gm)                       8


     Teaching Recipient                          Patient


     Learning Readiness                          Good


     Teaching Methods                            Discussion


                                                 Handout


     Response to Teaching                        Verbalize understanding


     Education Handouts Provided                 Carbohydrate Counting for


                                                 People with Diabetes


     Barriers to Learning                        No Barriers


     RD phone number provided                    Yes


     Patient aware of follow up options          Yes


     Goal #1                                     PO tolerance


     Goal #2                                     Meet at least 75% of calorie


                                                 and protein needs via PO and


                                                 ONS intakes


     Anticipated Discharge Needs:                unable to determine at this


                                                 time


     Follow-Up By:                               01/29/19


     Additional Comments                         Follow for PO and ONS intakes

## 2019-01-28 NOTE — NUCLEAR MEDICINE REPORT
Gastric emptying scan:



Diabetic with intractable nausea and vomiting.



Following oral ingestion of technetium 99m sulfur colloid tagged 

oatmeal this patient demonstrated a one half gastric emptying time of 

greater than 90 minutes. Only 4% of the gastric content emptying during 

the 90 minute observation period



Impression:



Abnormal exam.

## 2019-01-29 RX ADMIN — Medication SCH ML: at 21:29

## 2019-01-29 RX ADMIN — ONDANSETRON PRN MG: 2 INJECTION INTRAMUSCULAR; INTRAVENOUS at 08:36

## 2019-01-29 RX ADMIN — ENOXAPARIN SODIUM SCH: 100 INJECTION SUBCUTANEOUS at 21:29

## 2019-01-29 RX ADMIN — Medication SCH ML: at 12:11

## 2019-01-29 RX ADMIN — PANTOPRAZOLE SODIUM SCH MG: 40 INJECTION, POWDER, FOR SOLUTION INTRAVENOUS at 09:34

## 2019-01-29 RX ADMIN — SODIUM CHLORIDE SCH MLS/HR: 0.9 INJECTION, SOLUTION INTRAVENOUS at 12:40

## 2019-01-29 RX ADMIN — INSULIN GLARGINE SCH: 100 INJECTION, SOLUTION SUBCUTANEOUS at 22:40

## 2019-01-29 NOTE — PROGRESS NOTE
Assessment and Plan


Assessment and plan: 





28-year-old man with type 1 diabetes, presents with nausea vomiting and elevated

glucose





Problems


DKA


Type 1 diabetes


Intractable nausea and vomiting


, benzo seeking behavior





Plan


continue insulins, cont IVF


-Antiemetics as needed, a1c 10.4,  due to gastroparesis,  NM gastric emptying 

study confirms it





-NPO now, then ADAT


-DVT prophylaxis chemical


- patient claiming he takes clonipin for PTSD, checked  [facility] profile, is not 

on any controlled substances, called his PCP and they do not have him on it 

either


-patient was counseled about rx drug dependence and risks of being on chronic 

benzos














History


Interval history: 





Review of systems


Constitutional: No fevers, no malaise, no joint pains





CVS: No chest pain, no orthopnea, no dyspnea on exertion, no pedal edema





GI: c/o nausea and vomiting, RN states that he is not eating





Respiratory: No shortness of breath, no wheezing, no coughing





Hospitalist Physical





- Physical exam


Narrative exam: 





General.: Appears well, no distress, nontoxic


HEENT: Moist mucous membranes, extraocular muscles intact, no lymphadenopathy


Neck: supple


Cardiac: S1-S2 heard


Lungs: clear to auscultation bilaterally


Abdomen: soft , nontender,  nondistended,  bowel sounds positive


Extremities: no edema clubbing or cyanosis


Skin: no rash or lesions


Neurologic: no gross focal deficits


Psych: calm, and cooperative





- Constitutional


Vitals: 


                                        











Temp Pulse Resp BP Pulse Ox


 


 98.8 F   99 H  14   110/65   98 


 


 01/29/19 12:30  01/29/19 12:30  01/29/19 12:30  01/29/19 12:30  01/29/19 12:30











General appearance: Present: mild distress





Results





- Labs


CBC & Chem 7: 


                                 01/23/19 08:58





                                 01/25/19 00:33


Labs: 


                             Laboratory Last Values











WBC  9.2 K/mm3 (4.5-11.0)   01/23/19  08:58    


 


RBC  4.99 M/mm3 (3.65-5.03)   01/23/19  08:58    


 


Hgb  15.0 gm/dl (11.8-15.2)   01/23/19  08:58    


 


Hct  45.7 % (35.5-45.6)  H  01/23/19  08:58    


 


MCV  92 fl (84-94)   01/23/19  08:58    


 


MCH  30 pg (28-32)   01/23/19  08:58    


 


MCHC  33 % (32-34)   01/23/19  08:58    


 


RDW  12.5 % (13.2-15.2)  L  01/23/19  08:58    


 


Plt Count  243 K/mm3 (140-440)   01/23/19  08:58    


 


Lymph % (Auto)  9.1 % (13.4-35.0)  L  01/23/19  08:58    


 


Mono % (Auto)  5.8 % (0.0-7.3)   01/23/19  08:58    


 


Eos % (Auto)  0.0 % (0.0-4.3)   01/23/19  08:58    


 


Baso % (Auto)  0.2 % (0.0-1.8)   01/23/19  08:58    


 


Lymph #  0.8 K/mm3 (1.2-5.4)  L  01/23/19  08:58    


 


Mono #  0.5 K/mm3 (0.0-0.8)   01/23/19  08:58    


 


Eos #  0.0 K/mm3 (0.0-0.4)   01/23/19  08:58    


 


Baso #  0.0 K/mm3 (0.0-0.1)   01/23/19  08:58    


 


Seg Neutrophils %  84.9 % (40.0-70.0)  H  01/23/19  08:58    


 


Seg Neutrophils #  7.8 K/mm3 (1.8-7.7)  H  01/23/19  08:58    


 


VBG pH  7.235  (7.320-7.420)  L  01/23/19  08:58    


 


Sodium  134 mmol/L (137-145)  L  01/24/19  17:10    


 


Potassium  4.3 mmol/L (3.6-5.0)   01/25/19  00:33    


 


Chloride  98.8 mmol/L ()   01/24/19  17:10    


 


Carbon Dioxide  10 mmol/L (22-30)  L D 01/24/19  17:10    


 


Anion Gap  29 mmol/L  01/24/19  17:10    


 


BUN  13 mg/dL (9-20)   01/24/19  17:10    


 


Creatinine  1.1 mg/dL (0.8-1.5)   01/24/19  17:10    


 


Estimated GFR  > 60 ml/min  01/24/19  17:10    


 


BUN/Creatinine Ratio  12 %  01/24/19  17:10    


 


Glucose  369 mg/dL ()  H  01/24/19  17:10    


 


POC Glucose  124  ()  H  01/29/19  11:34    


 


Hemoglobin A1c  10.4 % (4-6)  H  01/24/19  01:51    


 


Calcium  8.6 mg/dL (8.4-10.2)   01/24/19  17:10    


 


Phosphorus  2.80 mg/dL (2.5-4.5)  D 01/25/19  06:10    


 


Magnesium  1.80 mg/dL (1.7-2.3)   01/24/19  01:51    


 


Total Bilirubin  0.70 mg/dL (0.1-1.2)   01/24/19  17:10    


 


Direct Bilirubin  0.2 mg/dL (0-0.2)   01/23/19  08:58    


 


Indirect Bilirubin  0.5 mg/dL  01/23/19  08:58    


 


AST  12 units/L (5-40)   01/24/19  17:10    


 


ALT  14 units/L (7-56)   01/24/19  17:10    


 


Alkaline Phosphatase  68 units/L ()   01/24/19  17:10    


 


Total Protein  6.6 g/dL (6.3-8.2)  D 01/24/19  17:10    


 


Albumin  3.8 g/dL (3.9-5)  L  01/24/19  17:10    


 


Albumin/Globulin Ratio  1.4 %  01/24/19  17:10    


 


Urine Color  Straw  (Yellow)   01/23/19  10:55    


 


Urine Turbidity  Clear  (Clear)   01/23/19  10:55    


 


Urine pH  5.0  (5.0-7.0)   01/23/19  10:55    


 


Ur Specific Gravity  1.024  (1.003-1.030)   01/23/19  10:55    


 


Urine Protein  <15 mg/dl mg/dL (Negative)   01/23/19  10:55    


 


Urine Glucose (UA)  >=500 mg/dL (Negative)   01/23/19  10:55    


 


Urine Ketones  80 mg/dL (Negative)   01/23/19  10:55    


 


Urine Blood  Neg  (Negative)   01/23/19  10:55    


 


Urine Nitrite  Neg  (Negative)   01/23/19  10:55    


 


Urine Bilirubin  Neg  (Negative)   01/23/19  10:55    


 


Urine Urobilinogen  < 2.0 mg/dL (<2.0)   01/23/19  10:55    


 


Ur Leukocyte Esterase  Neg  (Negative)   01/23/19  10:55    


 


Urine WBC (Auto)  2.0 /HPF (0.0-6.0)   01/23/19  10:55    


 


Urine RBC (Auto)  3.0 /HPF (0.0-6.0)   01/23/19  10:55    


 


Urine Mucus  Few /HPF  01/23/19  10:55    














Nutrition/Malnutrition Assess





- Dietary Evaluation


Nutrition/Malnutrition Findings: 


                                        





Nutrition Notes                                            Start:  01/23/19 

15:17


Freq:                                                      Status: Active       




Protocol:                                                                       




 Document     01/29/19 15:19  RM  (Rec: 01/29/19 15:22  RM  [id number])


 Nutrition Notes


     Initial or Follow up                        Brief Note


     Current Diet                                Clear liquid


     Subjective/Other Information                Diet replaced with duplicate


                                                 of previous diet and Ensure


                                                 Clear not carried over.


                                                 Pt stated that he has not been


                                                 drinking his meals d/t not


                                                 being able to keep them down.


                                                 Stated he has not received


                                                 Ensure Clear.


 Nutrition Intervention


     Add Supplement/Snack (indicate name/kcal    Ensure Clear 1 daily


      /protein )                                 


     Provides kCal:                              240


     Provides Protein (gm)                       8


     Follow-Up By:                               01/31/19


     Additional Comments                         Lexx for PO and ONS intakes

## 2019-01-30 VITALS — SYSTOLIC BLOOD PRESSURE: 134 MMHG | DIASTOLIC BLOOD PRESSURE: 85 MMHG

## 2019-01-30 RX ADMIN — Medication SCH ML: at 13:10

## 2019-01-30 NOTE — DISCHARGE SUMMARY
Providers





- Providers


Date of Admission: 


01/23/19 09:48





Attending physician: 


CHANDLER YEAGER MD





                                        





01/23/19 09:52


Consult to Physician [CONS] Stat 


   Comment: 


   Consulting Provider: WESLEY TAMAYO


   Physician Instructions: 


   Reason For Exam: DKA





01/23/19 12:32


Consult to Dietitian/Nutrition [CONS] Routine 


   Physician Instructions: 


   Reason For Exam: DKA


   Reason for Consult: Nutrition Recommendations


   Reason for Consult: Diet education











Primary care physician: 


PRIMARY CARE MD








Hospitalization


Condition: Stable


Hospital course: 





28-year-old man with type 1 diabetes, presents with nausea vomiting and elevated

 glucose





Problems


DKA


Type 1 diabetes, uncontrolled, A1c 10.4


Intractable nausea and vomiting


, benzo seeking behavior





hospital course


He was treated with insulin drip, then transitioned to subcutaneous insulin


-Nuclear medicine gastric emptying study confirmed severe gastroparesis


-He was treated with IV fluids and antiemetics,  diet was advanced slowly as 

tolerated


- patient claiming he takes clonipin for PTSD, checked  [facility]I profile, is not 

on any controlled substances, called his PCP and they do not have him on it 

either


-patient was counseled about rx drug dependence and risks of being on chronic 

benzos


Disposition: DC-01 TO HOME OR SELFCARE


Time spent for discharge: 33 mins





Core Measure Documentation





- Palliative Care


Palliative Care/ Comfort Measures: Not Applicable





- Core Measures


Any of the following diagnoses?: none





Exam





- Constitutional


Vitals: 


                                        











Temp Pulse Resp BP Pulse Ox


 


 98.9 F   77   20   137/70   99 


 


 01/30/19 04:37  01/30/19 04:37  01/30/19 04:37  01/30/19 04:37  01/30/19 04:37











General appearance: Present: no acute distress, well-nourished





- EENT


Eyes: Present: PERRL


ENT: hearing intact, clear oral mucosa





- Neck


Neck: Present: supple, normal ROM





- Respiratory


Respiratory effort: normal


Respiratory: bilateral: CTA





- Cardiovascular


Heart Sounds: Present: S1 & S2.  Absent: rub, click





- Extremities


Extremities: pulses symmetrical, No edema


Peripheral Pulses: within normal limits





- Abdominal


General gastrointestinal: Present: soft, non-tender, non-distended, normal bowel

 sounds


Male genitourinary: Present: normal





- Integumentary


Integumentary: Present: clear, warm, dry





- Musculoskeletal


Musculoskeletal: gait normal, strength equal bilaterally





- Psychiatric


Psychiatric: appropriate mood/affect, intact judgment & insight





- Neurologic


Neurologic: CNII-XII intact, moves all extremities





Plan


Follow up with: 


PRIMARY CARE,MD [Primary Care Provider] - 7 Days


Prescriptions: 


RX: Insulin Glargine,Hum.rec.anlog [Lantus] 24 units SUB-Q HS #1 vial


RX: Insulin Lispro [HumaLOG VIAL] 0 units SQ AC #1 vial


Ondansetron [Zofran ODT TAB] 8 mg PO Q8H PRN #90 tab.rapdis


 PRN Reason: Nausea


Pantoprazole [Protonix] 40 mg PO QDAY #30 tablet

## 2020-02-26 ENCOUNTER — HOSPITAL ENCOUNTER (EMERGENCY)
Dept: HOSPITAL 5 - ED | Age: 30
Discharge: HOME | End: 2020-02-26
Payer: SELF-PAY

## 2020-02-26 VITALS — DIASTOLIC BLOOD PRESSURE: 51 MMHG | SYSTOLIC BLOOD PRESSURE: 118 MMHG

## 2020-02-26 DIAGNOSIS — Z88.8: ICD-10-CM

## 2020-02-26 DIAGNOSIS — J45.909: ICD-10-CM

## 2020-02-26 DIAGNOSIS — E11.65: Primary | ICD-10-CM

## 2020-02-26 DIAGNOSIS — I50.9: ICD-10-CM

## 2020-02-26 DIAGNOSIS — Z79.4: ICD-10-CM

## 2020-02-26 DIAGNOSIS — K31.84: ICD-10-CM

## 2020-02-26 DIAGNOSIS — Z79.899: ICD-10-CM

## 2020-02-26 LAB
ALBUMIN SERPL-MCNC: 4.5 G/DL (ref 3.9–5)
ALT SERPL-CCNC: 28 UNITS/L (ref 7–56)
BUN SERPL-MCNC: 11 MG/DL (ref 9–20)
BUN/CREAT SERPL: 12 %
CALCIUM SERPL-MCNC: 9.7 MG/DL (ref 8.4–10.2)
HCT VFR BLD CALC: 44.7 % (ref 35.5–45.6)
HEMOLYSIS INDEX: 8
HGB BLD-MCNC: 14.8 GM/DL (ref 11.8–15.2)
MCHC RBC AUTO-ENTMCNC: 33 % (ref 32–34)
MCV RBC AUTO: 90 FL (ref 84–94)
PLATELET # BLD: 217 K/MM3 (ref 140–440)
RBC # BLD AUTO: 4.97 M/MM3 (ref 3.65–5.03)

## 2020-02-26 PROCEDURE — 83735 ASSAY OF MAGNESIUM: CPT

## 2020-02-26 PROCEDURE — 82550 ASSAY OF CK (CPK): CPT

## 2020-02-26 PROCEDURE — 96375 TX/PRO/DX INJ NEW DRUG ADDON: CPT

## 2020-02-26 PROCEDURE — 96361 HYDRATE IV INFUSION ADD-ON: CPT

## 2020-02-26 PROCEDURE — 99284 EMERGENCY DEPT VISIT MOD MDM: CPT

## 2020-02-26 PROCEDURE — 93010 ELECTROCARDIOGRAM REPORT: CPT

## 2020-02-26 PROCEDURE — 93005 ELECTROCARDIOGRAM TRACING: CPT

## 2020-02-26 PROCEDURE — 80053 COMPREHEN METABOLIC PANEL: CPT

## 2020-02-26 PROCEDURE — 82962 GLUCOSE BLOOD TEST: CPT

## 2020-02-26 PROCEDURE — 82805 BLOOD GASES W/O2 SATURATION: CPT

## 2020-02-26 PROCEDURE — 96374 THER/PROPH/DIAG INJ IV PUSH: CPT

## 2020-02-26 PROCEDURE — 85027 COMPLETE CBC AUTOMATED: CPT

## 2020-02-26 PROCEDURE — 96372 THER/PROPH/DIAG INJ SC/IM: CPT

## 2020-02-26 PROCEDURE — 36415 COLL VENOUS BLD VENIPUNCTURE: CPT

## 2020-02-26 PROCEDURE — 83690 ASSAY OF LIPASE: CPT

## 2020-02-26 NOTE — EMERGENCY DEPARTMENT REPORT
ED General Adult HPI





- General


Chief complaint: Nausea/Vomiting/Diarrhea


Stated complaint: DIABETIC/VOMITING


Time Seen by Provider: 02/26/20 10:33


Source: patient, RN notes reviewed, old records reviewed


Mode of arrival: Wheelchair


Limitations: No Limitations





- History of Present Illness


Initial comments: 





The patient is a 29-year-old gentleman.  He is not known to myself previously.  

He reports that he goes to [facility] for his primary care.  

His past medical history includes type 1 diabetes, hyperglycemia, poorly 

controlled type 1 diabetes, with a recent hemoglobin A1c of 10.4, nuclear 

medicine study confirmed gastroparesis, and possible substance abuse.  He also 

consumes recreational marijuana.  He presents to the ER with a complaint of 

nontraumatic diffuse abdominal cramping and discomfort, nausea and vomiting.  He

reports his symptoms started this morning.  He reports they are constant.  He 

does not believe they have relieving factors.  They worsen when he attempts to 

eat or drink.  He denies irritative and obstructive urinary symptoms.  He denies

focal extremity weakness and/or numbness.  The patient does indicate he consumes

recreational marijuana.  He is medicated with haloperidol and Pepcid in the 

emergency room, which markedly improved his symptoms.


-: Gradual, hour(s)


Location: abdomen


Radiation: non-radiation


Improves with: other


Worsens with: other


Associated Symptoms: other





- Related Data


                                  Previous Rx's











 Medication  Instructions  Recorded  Last Taken  Type


 


Insulin Glargine,Hum.rec.anlog 24 units SUB-Q HS #1 vial 01/28/19 Unknown Rx





[Lantus]    


 


Insulin Lispro [HumaLOG VIAL] 0 units SQ AC #1 vial 01/28/19 Unknown Rx


 


Ondansetron [Zofran ODT TAB] 8 mg PO Q8H PRN #90 tab.rapdis 01/28/19 Unknown Rx


 


Ginger Root [Ginger] 250 mg PO QID PRN #30 capsule 02/26/20 Unknown Rx


 


Metoclopramide [Reglan] 10 mg PO QID PRN #30 tab 02/26/20 Unknown Rx


 


Pantoprazole [Protonix TAB] 40 mg PO QDAY #30 tablet 02/26/20 Unknown Rx


 


Promethazine [Phenergan] 25 mg ME Q6HR PRN #15 supp.rect 02/26/20 Unknown Rx











                                    Allergies











Allergy/AdvReac Type Severity Reaction Status Date / Time


 


metoclopramide [From Reglan] AdvReac  panic Verified 01/23/19 11:53





   attack  














ED Review of Systems


ROS: 


Stated complaint: DIABETIC/VOMITING


Other details as noted in HPI





Constitutional: malaise


Eyes: denies: eye discharge


ENT: denies: congestion


Respiratory: denies: wheezing


Cardiovascular: denies: syncope


Gastrointestinal: abdominal pain, nausea, vomiting


Genitourinary: denies: dysuria


Musculoskeletal: as per HPI


Skin: as per HPI


Neurological: weakness


Psychiatric: as per HPI, anxiety


Hematological/Lymphatic: as per HPI





ED Past Medical Hx





- Past Medical History


Previous Medical History?: Yes


Hx Congestive Heart Failure: Yes


Hx Diabetes: Yes (Type I)


Hx Asthma: Yes


Hx COPD: No


Hx HIV: No


Additional medical history: Gastroparesis





- Surgical History


Hx Open Heart Surgery: No


Hx Cholecystectomy: No


Hx Appendectomy: No


Hx Breast Surgery: No





- Social History


Smoking Status: Never Smoker





- Medications


Home Medications: 


                                Home Medications











 Medication  Instructions  Recorded  Confirmed  Last Taken  Type


 


Insulin Glargine,Hum.rec.anlog 24 units SUB-Q HS #1 vial 01/28/19  Unknown Rx





[Lantus]     


 


Insulin Lispro [HumaLOG VIAL] 0 units SQ AC #1 vial 01/28/19  Unknown Rx


 


Ondansetron [Zofran ODT TAB] 8 mg PO Q8H PRN #90 tab.rapdis 01/28/19  Unknown Rx


 


Ginger Root [Ginger] 250 mg PO QID PRN #30 capsule 02/26/20  Unknown Rx


 


Metoclopramide [Reglan] 10 mg PO QID PRN #30 tab 02/26/20  Unknown Rx


 


Pantoprazole [Protonix TAB] 40 mg PO QDAY #30 tablet 02/26/20  Unknown Rx


 


Promethazine [Phenergan] 25 mg ME Q6HR PRN #15 supp.rect 02/26/20  Unknown Rx














ED Physical Exam





- General


Limitations: No Limitations


General appearance: alert, anxious, in distress





- Head


Head exam: Present: atraumatic, normocephalic





- Eye


Eye exam: Present: normal appearance, EOMI.  Absent: nystagmus





- ENT


ENT exam: Present: normal exam, normal orophraynx, mucous membranes moist, 

normal external ear exam





- Neck


Neck exam: Present: normal inspection, full ROM.  Absent: tenderness, 

meningismus





- Respiratory


Respiratory exam: Present: normal lung sounds bilaterally.  Absent: respiratory 

distress





- Cardiovascular


Cardiovascular Exam: Present: regular rate, normal rhythm, normal heart sounds. 

 Absent: bradycardia, tachycardia, irregular rhythm, systolic murmur, diastolic 

murmur, rubs, gallop





- GI/Abdominal


GI/Abdominal exam: Present: soft, other (There is no rebound, guarding or pe

ritoneal sign.  There is no right lower quadrant tenderness.  There is no right 

upper quadrant tenderness.  There is negative Rovsing sign.  There is negative 

Mazariegos sign.).  Absent: distended, tenderness, guarding, rebound, rigid, 

pulsatile mass





- Rectal


Rectal exam: Present: deferred





- Extremities Exam


Extremities exam: Present: normal inspection, full ROM, other (2+ pulses noted 

in the bilateral upper and lower extremities.  There is no palpable cord.   

negative Homans sign.  Muscular compartments are soft.  The pelvis is stable.). 

 Absent: tenderness, calf tenderness





- Back Exam


Back exam: Present: normal inspection, full ROM.  Absent: tenderness, CVA 

tenderness (R), CVA tenderness (L), paraspinal tenderness, vertebral tenderness





- Neurological Exam


Neurological exam: Present: alert, other (There is no facial droop.  The tongue 

is midline.  Extraocular movements are intact bilaterally.  There is 5 out of 5 

strength in bilateral upper and lower extremities.  Sensation is intact to light

 touch bilateral upper and lower extremities. ).  Absent: motor sensory deficit





- Psychiatric


Psychiatric exam: Present: anxious





- Skin


Skin exam: Present: warm, dry, intact, normal color.  Absent: rash





ED Course


                                   Vital Signs











  02/26/20 02/26/20 02/26/20





  10:12 10:56 11:00


 


Temperature 98 F  


 


Pulse Rate 74  71


 


Respiratory 16 16 12





Rate   


 


Blood Pressure   132/80


 


Blood Pressure 132/72  





[Right]   


 


O2 Sat by Pulse 97 100 99





Oximetry   














  02/26/20 02/26/20 02/26/20





  11:28 12:00 13:00


 


Temperature   


 


Pulse Rate 71 78 91 H


 


Respiratory 13 16 17





Rate   


 


Blood Pressure 132/80 117/57 118/51


 


Blood Pressure   





[Right]   


 


O2 Sat by Pulse 99 99 100





Oximetry   














- Reevaluation(s)


Reevaluation #1: 





02/26/20 13:30


Hyperglycemia improved.  Vomiting resolved.  Patient will be discharged.





ED Medical Decision Making





- Lab Data


Result diagrams: 


                                 02/26/20 11:03





                                 02/26/20 11:03








                                   Vital Signs











  02/26/20 02/26/20 02/26/20





  10:12 10:56 11:28


 


Temperature 98 F  


 


Pulse Rate 74  71


 


Respiratory 16 16 13





Rate   


 


Blood Pressure   132/80


 


Blood Pressure 132/72  





[Right]   


 


O2 Sat by Pulse 97 100 99





Oximetry   











                                   Lab Results











  02/26/20 02/26/20 02/26/20 Range/Units





  10:19 11:03 11:03 


 


WBC   6.9   (4.5-11.0)  K/mm3


 


RBC   4.97   (3.65-5.03)  M/mm3


 


Hgb   14.8   (11.8-15.2)  gm/dl


 


Hct   44.7   (35.5-45.6)  %


 


MCV   90   (84-94)  fl


 


MCH   30   (28-32)  pg


 


MCHC   33   (32-34)  %


 


RDW   12.5 L   (13.2-15.2)  %


 


Plt Count   217   (140-440)  K/mm3


 


VBG pH     (7.320-7.420)  


 


Sodium    137  (137-145)  mmol/L


 


Potassium    4.6  (3.6-5.0)  mmol/L


 


Chloride    99.8  ()  mmol/L


 


Carbon Dioxide    21 L  (22-30)  mmol/L


 


Anion Gap    21  mmol/L


 


BUN    11  (9-20)  mg/dL


 


Creatinine    0.9  (0.8-1.5)  mg/dL


 


Estimated GFR    > 60  ml/min


 


BUN/Creatinine Ratio    12  %


 


Glucose    402 H  ()  mg/dL


 


POC Glucose  364 H    ()  


 


Calcium    9.7  (8.4-10.2)  mg/dL


 


Magnesium    1.70  (1.7-2.3)  mg/dL


 


Total Bilirubin    0.30  (0.1-1.2)  mg/dL


 


AST    25  (5-40)  units/L


 


ALT    28  (7-56)  units/L


 


Alkaline Phosphatase    91  ()  units/L


 


Total Creatine Kinase    204 H  ()  units/L


 


Total Protein    8.1  (6.3-8.2)  g/dL


 


Albumin    4.5  (3.9-5)  g/dL


 


Albumin/Globulin Ratio    1.3  %


 


Lipase     (13-60)  units/L














  02/26/20 02/26/20 02/26/20 Range/Units





  11:03 11:03 12:20 


 


WBC     (4.5-11.0)  K/mm3


 


RBC     (3.65-5.03)  M/mm3


 


Hgb     (11.8-15.2)  gm/dl


 


Hct     (35.5-45.6)  %


 


MCV     (84-94)  fl


 


MCH     (28-32)  pg


 


MCHC     (32-34)  %


 


RDW     (13.2-15.2)  %


 


Plt Count     (140-440)  K/mm3


 


VBG pH   7.335   (7.320-7.420)  


 


Sodium     (137-145)  mmol/L


 


Potassium     (3.6-5.0)  mmol/L


 


Chloride     ()  mmol/L


 


Carbon Dioxide     (22-30)  mmol/L


 


Anion Gap     mmol/L


 


BUN     (9-20)  mg/dL


 


Creatinine     (0.8-1.5)  mg/dL


 


Estimated GFR     ml/min


 


BUN/Creatinine Ratio     %


 


Glucose     ()  mg/dL


 


POC Glucose    314 H  ()  


 


Calcium     (8.4-10.2)  mg/dL


 


Magnesium     (1.7-2.3)  mg/dL


 


Total Bilirubin     (0.1-1.2)  mg/dL


 


AST     (5-40)  units/L


 


ALT     (7-56)  units/L


 


Alkaline Phosphatase     ()  units/L


 


Total Creatine Kinase     ()  units/L


 


Total Protein     (6.3-8.2)  g/dL


 


Albumin     (3.9-5)  g/dL


 


Albumin/Globulin Ratio     %


 


Lipase  5 L    (13-60)  units/L














- EKG Data


-: EKG Interpreted by Me


EKG shows normal: sinus rhythm


Rate: normal





- EKG Data





02/26/20 12:48


Sinus rhythm, 63 bpm, normal axis,  ms, repolarization disturbance noted,

 high left ventricular voltage, motion artifact, not consistent with ST elevati

on myocardial infarction.





- Medical Decision Making





Differential diagnosis, including but not limited to: Hyperglycemia, diabetic 

ketoacidosis, hyperosmolar state, gastroparesis flare/exacerbation, cannabinoid 

hyperemesis syndrome





Assessment and plan: 29-year-old gentleman with soft benign abdomen, known 

history of gastroparesis, presenting with nausea and vomiting, and 

hyperglycemia.  Laboratory studies reviewed and appreciated, not consistent with

 diabetic ketoacidosis.  His symptoms are markedly improved with haloperidol.  

He is resting comfortably on a stretcher at this time, in no acute distress, and

 with no active vomiting.





His "allergy" to Reglan/metoclopramide is "spasming out."  He does not endorse 

any anaphylactic or anaphylactoid symptoms.  Patient educated that he does not 

have a true allergic reaction to this particular medication.  The patient will 

need to discontinue marijuana consumption, and he will need to pursue optimal 

and improved glycemic control.  At the moment, he does not meet criteria for 

admission to the medical service.


Critical care attestation.: 


If time is entered above; I have spent that time in minutes in the direct care 

of this critically ill patient, excluding procedure time.








ED Disposition


Clinical Impression: 


 Hyperglycemia, Gastroparesis





Disposition: DC-01 TO HOME OR SELFCARE


Is pt being admited?: No


Does the pt Need Aspirin: No


Condition: Stable


Additional Instructions: 


We recommend the patient avoid consumption of Motrin, ibuprofen, Naprosyn, 

Aleve.





We recommend that patient avoid consumption and secondhand exposure to 

marijuana/cannabinoids/cannabis.  We recommend that patient avoid consumption of

 alcohol.  Recommend that patient adhere to an appropriate diabetic diet, and 

avoid consumption of simple carbohydrates and sugars.  Patient may reference the

 American diabetes Association website for specific recommendations on how he 

may modify his diet.  Patient may take the nausea medications as needed and 

directed, and he can take Tylenol over-the-counter as needed for pain, 

alternating with Pepcid over-the-counter as needed for pain, or the 

pantoprazole.





Recommend that patient follow-up with her primary care doctor within the next 7 

to 10 days, advance diet as tolerated, and drink plenty of water.  Avoid 

consumption of sugar.  Please return to the emergency room right away with new, 

worsened or different symptoms, or symptoms not present on the initial emergency

 room evaluation.


Prescriptions: 


Ginger Root [Ginger] 250 mg PO QID PRN #30 capsule


 PRN Reason: Nausea


Promethazine [Phenergan] 25 mg ME Q6HR PRN #15 supp.rect


 PRN Reason: Nausea


Pantoprazole [Protonix TAB] 40 mg PO QDAY #30 tablet


Metoclopramide [Reglan] 10 mg PO QID PRN #30 tab


 PRN Reason: Nausea


Referrals: 


[facility] [Provider Group] - 3-5 Days


[facility] [Provider Group] - 3-5 Days

## 2020-04-15 ENCOUNTER — HOSPITAL ENCOUNTER (EMERGENCY)
Dept: HOSPITAL 5 - ED | Age: 30
Discharge: HOME | End: 2020-04-15
Payer: SELF-PAY

## 2020-04-15 VITALS — SYSTOLIC BLOOD PRESSURE: 101 MMHG | DIASTOLIC BLOOD PRESSURE: 62 MMHG

## 2020-04-15 DIAGNOSIS — Z79.899: ICD-10-CM
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
## 2020-04-15 DIAGNOSIS — J45.909: ICD-10-CM
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
## 2020-04-15 DIAGNOSIS — F12.10: ICD-10-CM

## 2020-04-15 DIAGNOSIS — Z88.8: ICD-10-CM

## 2020-04-15 DIAGNOSIS — S46.811A: Primary | ICD-10-CM
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
## 2020-04-15 DIAGNOSIS — E11.9: ICD-10-CM

## 2020-04-15 PROCEDURE — 99283 EMERGENCY DEPT VISIT LOW MDM: CPT

## 2020-04-15 NOTE — EMERGENCY DEPARTMENT REPORT
ED Extremity Problem HPI





- General


Chief complaint: Extremity Injury, Lower


Stated complaint: DISLOCATED (R) SHOULDER


Time Seen by Provider: 04/15/20 19:21


Source: patient


Mode of arrival: Ambulatory


Limitations: Physical Limitation





- History of Present Illness


Initial comments: 





pt is a 28 yo male who presents to the ED with c/o right shoulder pain that 

began just PTA. he states he was playing with his friend and accidentally fell 

onto his right shoulder. it was a ground level fall inside the house. he states 

that he immediately began to feel pain. he has discomfort with movement of the 

shoulder. he states he tried to take an over the counter medication at home 

without much relief. he has never injured this shoulder before. he denies any 

numbness or weakness. he denies any other injury. PMHx DM, states controlled. no

allergies to meds. 





- Related Data


                                  Previous Rx's











 Medication  Instructions  Recorded  Last Taken  Type


 


Insulin Glargine,Hum.rec.anlog 24 units SUB-Q HS #1 vial 01/28/19 Unknown Rx





[Lantus]    


 


Insulin Lispro [HumaLOG VIAL] 0 units SQ AC #1 vial 01/28/19 Unknown Rx


 


Ondansetron [Zofran ODT TAB] 8 mg PO Q8H PRN #90 tab.rapdis 01/28/19 Unknown Rx


 


Kristen Root [Kristen] 250 mg PO QID PRN #30 capsule 02/26/20 Unknown Rx


 


Metoclopramide [Reglan] 10 mg PO QID PRN #30 tab 02/26/20 Unknown Rx


 


Pantoprazole [Protonix TAB] 40 mg PO QDAY #30 tablet 02/26/20 Unknown Rx


 


Promethazine [Phenergan] 25 mg ID Q6HR PRN #15 supp.rect 02/26/20 Unknown Rx


 


Cyclobenzaprine [Flexeril] 10 mg PO BID PRN #12 tablet 04/15/20 Unknown Rx


 


Naproxen [EC-Naproxen] 500 mg PO BID PRN #20 tablet. 04/15/20 Unknown Rx











                                    Allergies











Allergy/AdvReac Type Severity Reaction Status Date / Time


 


metoclopramide [From Reglan] AdvReac  panic Verified 01/23/19 11:53





   attack  














ED Review of Systems


ROS: 


Stated complaint: DISLOCATED (R) SHOULDER


Other details as noted in HPI





Comment: All other systems reviewed and negative





ED Past Medical Hx





- Past Medical History


Hx Congestive Heart Failure: Yes


Hx Diabetes: Yes (Type I)


Hx Asthma: Yes


Hx COPD: No


Hx HIV: No


Additional medical history: Gastroparesis





- Surgical History


Past Surgical History?: No


Hx Open Heart Surgery: No


Hx Cholecystectomy: No


Hx Appendectomy: No


Hx Breast Surgery: No





- Social History


Smoking Status: Never Smoker


Substance Use Type: Marijuana





- Medications


Home Medications: 


                                Home Medications











 Medication  Instructions  Recorded  Confirmed  Last Taken  Type


 


Insulin Glargine,Hum.rec.anlog 24 units SUB-Q HS #1 vial 01/28/19  Unknown Rx





[Lantus]     


 


Insulin Lispro [HumaLOG VIAL] 0 units SQ AC #1 vial 01/28/19  Unknown Rx


 


Ondansetron [Zofran ODT TAB] 8 mg PO Q8H PRN #90 tab.rapdis 01/28/19  Unknown Rx


 


Kristen Root [Kristen] 250 mg PO QID PRN #30 capsule 02/26/20  Unknown Rx


 


Metoclopramide [Reglan] 10 mg PO QID PRN #30 tab 02/26/20  Unknown Rx


 


Pantoprazole [Protonix TAB] 40 mg PO QDAY #30 tablet 02/26/20  Unknown Rx


 


Promethazine [Phenergan] 25 mg ID Q6HR PRN #15 supp.rect 02/26/20  Unknown Rx


 


Cyclobenzaprine [Flexeril] 10 mg PO BID PRN #12 tablet 04/15/20  Unknown Rx


 


Naproxen [EC-Naproxen] 500 mg PO BID PRN #20 tablet.dr 04/15/20  Unknown Rx














ED Physical Exam





- General


Limitations: Physical Limitation


General appearance: alert, in no apparent distress





- Head


Head exam: Present: atraumatic, normocephalic





- Eye


Eye exam: Present: normal appearance





- ENT


ENT exam: Present: mucous membranes moist





- Extremities Exam


Extremities exam: Present: other (ttp over the right trapezius muscle, trapezius

muscle feels tight/spasm, mild AC joint ttp, full passive ROM of the right 

shoulder with discomfort upon full flexion, no sulcus sign, no obvious joint 

laxity, no obvious deformity, no crepitus, no ecchymosis, neurovascularly 

intact)





- Neurological Exam


Neurological exam: Present: alert, oriented X3





- Psychiatric


Psychiatric exam: Present: normal affect, normal mood





- Skin


Skin exam: Present: warm, dry, intact





ED Course


                                   Vital Signs











  04/15/20





  18:00


 


Temperature 97.6 F


 


Pulse Rate 74


 


Respiratory 18





Rate 


 


Blood Pressure 101/62


 


O2 Sat by Pulse 99





Oximetry 














ED Medical Decision Making





- Radiology Data


Radiology results: report reviewed





RIGHT SHOULDER, 3 VIEWS 





INDICATION / CLINICAL INFORMATION: 


pain and decreased ROM. 





COMPARISON: 


None available. 





FINDINGS: 


No significant osseous or soft tissue abnormality. The shoulder is intact and 

without evidence of 


 fracture, dislocation, or significant degenerative change. Visualized right 

ribs are unremarkable. 





IMPRESSION: Negative exam. 





Signer Name: Maryann Judd MD 


Signed: 4/15/2020 7:04 PM 


Workstation Name: [id number] 








 Transcribed By:  


 Dictated By: Maryann Judd MD 


 Electronically Authenticated By: Maryann Judd MD 


 Signed Date/Time: 04/15/20 1904 











 DD/DT: 04/15/20 1904 


 TD/TT: 





- Medical Decision Making





pt is a 28 yo male who presents to the ED with c/o right shoulder pain that 

began just PTA. he states he was playing with his friend and accidentally fell 

onto his right shoulder. it was a ground level fall inside the house. he states 

that he immediately began to feel pain. he has discomfort with movement of the 

shoulder. he states he tried to take an over the counter medication at home 

without much relief. he has never injured this shoulder before. he denies any 

numbness or weakness. he denies any other injury. PMHx DM, states controlled. no

allergies to meds.  Vitals are normal.  On exam:ttp over the right trapezius 

muscle, trapezius muscle feels tight/spasm, mild AC joint ttp, full passive ROM 

of the right shoulder with discomfort upon full flexion, no sulcus sign, no 

obvious joint laxity, no obvious deformity, no crepitus, no ecchymosis, 

neurovascularly intact. XR right shoulder: Negative exam.  Discussed all results

with patient.  Discussed with patient to follow-up with an orthopedic doctor for

further evaluation, no signs of obvious joint laxity but patient has discomfort 

with full flexion.  Examination consistent with shoulder sprain.  Also appears 

to have right trapezius muscle strain/spasm.  Patient given prescription for 

naproxen and Flexeril.  Advised patient please take medication as prescribed. do

not drive or operate heavy machinery while taking muscle relaxer. may use ice 

pack, heating pad, rest, epsom salt bath. follow up with an orthopedic doctor 

for further evaluation. return to the emergency room for any new or worsening 

symptoms. 





- Differential Diagnosis


Strain, sprain, fracture, dislocation, rotator cuff injury


Critical care attestation.: 


If time is entered above; I have spent that time in minutes in the direct care 

of this critically ill patient, excluding procedure time.








ED Disposition


Clinical Impression: 


Right shoulder pain


Qualifiers:


 Chronicity: acute Qualified Code(s): M25.511 - Pain in right shoulder





Strain of right trapezius muscle


Qualifiers:


 Encounter type: initial encounter Qualified Code(s): S46.811A - Strain of other

muscles, fascia and tendons at shoulder and upper arm level, right arm, initial 

encounter





Disposition: DC-01 TO HOME OR SELFCARE


Is pt being admited?: No


Does the pt Need Aspirin: No


Condition: Stable


Instructions:  Muscle Strain (ED), Shoulder Sprain (ED)


Additional Instructions: 


please take medication as prescribed. do not drive or operate heavy machinery 

while taking muscle relaxer. may use ice pack, heating pad, rest, epsom salt 

bath. follow up with an orthopedic doctor for further evaluation. return to the 

emergency room for any new or worsening symptoms. 


Prescriptions: 


Naproxen [EC-Naproxen] 500 mg PO BID PRN #20 tablet.


 PRN Reason: pain


Cyclobenzaprine [Flexeril] 10 mg PO BID PRN #12 tablet


 PRN Reason: Muscle Spasm


Referrals: 


JENNY LOMELI MD [Staff Physician] - 3-5 Days


[facility] [Provider Group] - 3-5 Days


Time of Disposition: 19:26


Print Language: ENGLISH

## 2020-04-15 NOTE — XRAY REPORT
RIGHT SHOULDER, 3 VIEWS



INDICATION / CLINICAL INFORMATION:

pain and decreased ROM.



COMPARISON:

None available.



FINDINGS:

No significant osseous or soft tissue abnormality. The shoulder is intact and without evidence of fra
cture, dislocation, or significant degenerative change. Visualized right ribs are unremarkable.



IMPRESSION: Negative exam.



Signer Name: Maryann Judd MD 

Signed: 4/15/2020 7:04 PM

Workstation Name: [id number]

## 2020-04-23 ENCOUNTER — HOSPITAL ENCOUNTER (INPATIENT)
Dept: HOSPITAL 5 - ED | Age: 30
LOS: 3 days | Discharge: LEFT BEFORE DISCHARGE | DRG: 639 | End: 2020-04-26
Attending: INTERNAL MEDICINE | Admitting: INTERNAL MEDICINE
Payer: COMMERCIAL

## 2020-04-23 DIAGNOSIS — Z87.891: ICD-10-CM

## 2020-04-23 DIAGNOSIS — E10.10: Primary | ICD-10-CM

## 2020-04-23 DIAGNOSIS — E83.39: ICD-10-CM

## 2020-04-23 DIAGNOSIS — K29.70: ICD-10-CM

## 2020-04-23 DIAGNOSIS — Z79.899: ICD-10-CM

## 2020-04-23 DIAGNOSIS — Z53.29: ICD-10-CM

## 2020-04-23 DIAGNOSIS — E83.42: ICD-10-CM

## 2020-04-23 DIAGNOSIS — I50.9: ICD-10-CM

## 2020-04-23 DIAGNOSIS — J45.909: ICD-10-CM

## 2020-04-23 LAB
ALBUMIN SERPL-MCNC: 4.3 G/DL (ref 3.9–5)
ALT SERPL-CCNC: 24 UNITS/L (ref 7–56)
BASOPHILS # (AUTO): 0 K/MM3 (ref 0–0.1)
BASOPHILS NFR BLD AUTO: 0.4 % (ref 0–1.8)
BILIRUB UR QL STRIP: (no result)
BLOOD UR QL VISUAL: (no result)
BUN SERPL-MCNC: 13 MG/DL (ref 9–20)
BUN SERPL-MCNC: 13 MG/DL (ref 9–20)
BUN/CREAT SERPL: 12 %
BUN/CREAT SERPL: 14 %
CALCIUM SERPL-MCNC: 10 MG/DL (ref 8.4–10.2)
CALCIUM SERPL-MCNC: 10.1 MG/DL (ref 8.4–10.2)
EOSINOPHIL # BLD AUTO: 0 K/MM3 (ref 0–0.4)
EOSINOPHIL NFR BLD AUTO: 0.2 % (ref 0–4.3)
HCT VFR BLD CALC: 45.3 % (ref 35.5–45.6)
HEMOLYSIS INDEX: 15
HEMOLYSIS INDEX: 31
HGB BLD-MCNC: 15.1 GM/DL (ref 11.8–15.2)
LYMPHOCYTES # BLD AUTO: 2 K/MM3 (ref 1.2–5.4)
LYMPHOCYTES NFR BLD AUTO: 19.4 % (ref 13.4–35)
MCHC RBC AUTO-ENTMCNC: 33 % (ref 32–34)
MCV RBC AUTO: 90 FL (ref 84–94)
MONOCYTES # (AUTO): 0.5 K/MM3 (ref 0–0.8)
MONOCYTES % (AUTO): 4.5 % (ref 0–7.3)
PH UR STRIP: 7 [PH] (ref 5–7)
PLATELET # BLD: 179 K/MM3 (ref 140–440)
PROT UR STRIP-MCNC: (no result) MG/DL
RBC # BLD AUTO: 5.05 M/MM3 (ref 3.65–5.03)
RBC #/AREA URNS HPF: 1 /HPF (ref 0–6)
UROBILINOGEN UR-MCNC: < 2 MG/DL (ref ?–2)
WBC #/AREA URNS HPF: 1 /HPF (ref 0–6)

## 2020-04-23 PROCEDURE — 74177 CT ABD & PELVIS W/CONTRAST: CPT

## 2020-04-23 PROCEDURE — 84100 ASSAY OF PHOSPHORUS: CPT

## 2020-04-23 PROCEDURE — 83735 ASSAY OF MAGNESIUM: CPT

## 2020-04-23 PROCEDURE — 82962 GLUCOSE BLOOD TEST: CPT

## 2020-04-23 PROCEDURE — 80053 COMPREHEN METABOLIC PANEL: CPT

## 2020-04-23 PROCEDURE — 93005 ELECTROCARDIOGRAM TRACING: CPT

## 2020-04-23 PROCEDURE — 83690 ASSAY OF LIPASE: CPT

## 2020-04-23 PROCEDURE — 81001 URINALYSIS AUTO W/SCOPE: CPT

## 2020-04-23 PROCEDURE — 80048 BASIC METABOLIC PNL TOTAL CA: CPT

## 2020-04-23 PROCEDURE — 85025 COMPLETE CBC W/AUTO DIFF WBC: CPT

## 2020-04-23 PROCEDURE — 83930 ASSAY OF BLOOD OSMOLALITY: CPT

## 2020-04-23 PROCEDURE — 36415 COLL VENOUS BLD VENIPUNCTURE: CPT

## 2020-04-23 PROCEDURE — 83036 HEMOGLOBIN GLYCOSYLATED A1C: CPT

## 2020-04-23 PROCEDURE — 82805 BLOOD GASES W/O2 SATURATION: CPT

## 2020-04-23 RX ADMIN — DEXTROSE, SODIUM CHLORIDE, AND POTASSIUM CHLORIDE SCH MLS/HR: 5; .45; .15 INJECTION INTRAVENOUS at 20:55

## 2020-04-23 RX ADMIN — FAMOTIDINE SCH MG: 10 INJECTION, SOLUTION INTRAVENOUS at 22:54

## 2020-04-23 RX ADMIN — ONDANSETRON PRN MG: 2 INJECTION INTRAMUSCULAR; INTRAVENOUS at 22:54

## 2020-04-23 RX ADMIN — ONDANSETRON PRN MG: 2 INJECTION INTRAMUSCULAR; INTRAVENOUS at 19:30

## 2020-04-23 NOTE — EMERGENCY DEPARTMENT REPORT
HPI





- General


Chief Complaint: Nausea/Vomiting/Diarrhea


Time Seen by Provider: 20 10:36





- HPI


HPI: 





Room 22








The patient is a 29-year-old male present with a chief complaint of nausea 

vomiting abdominal pain.  The patient is a type I diabetic and has history of 

gastroparesis.  Patient states his symptoms began today.  Patient describes his 

midepigastric abdominal pain as a soreness and intermittent in nature.  Patient 

admits to subjective fever at home but denies sick contacts.  Patient states he 

has been compliant with his diabetic medication.  Patient currently gives his 

pain a score 7/10











ED Past Medical Hx





- Past Medical History


Previous Medical History?: Yes


Hx Congestive Heart Failure: Yes


Hx Diabetes: Yes (Type I)


Hx Asthma: Yes


Additional medical history: Gastroparesis





- Surgical History


Past Surgical History?: No





- Family History


Family history: no significant





- Social History


Smoking Status: Former Smoker


Substance Use Type: Marijuana





- Medications


Home Medications: 


                                Home Medications











 Medication  Instructions  Recorded  Confirmed  Last Taken  Type


 


Insulin Glargine,Hum.rec.anlog 24 units SUB-Q HS #1 vial 19  Unknown Rx





[Lantus]     


 


Insulin Lispro [HumaLOG VIAL] 0 units SQ AC #1 vial 19  Unknown Rx


 


Ondansetron [Zofran ODT TAB] 8 mg PO Q8H PRN #90 tab.rapdis 19  Unknown Rx


 


Kristen Root [Kristen] 250 mg PO QID PRN #30 capsule 20  Unknown Rx


 


Metoclopramide [Reglan] 10 mg PO QID PRN #30 tab 20  Unknown Rx


 


Pantoprazole [Protonix TAB] 40 mg PO QDAY #30 tablet 20  Unknown Rx


 


Promethazine [Phenergan] 25 mg LA Q6HR PRN #15 supp.rect 20  Unknown Rx


 


Cyclobenzaprine [Flexeril] 10 mg PO BID PRN #12 tablet 04/15/20  Unknown Rx


 


Naproxen [EC-Naproxen] 500 mg PO BID PRN #20 tablet. 04/15/20  Unknown Rx














ED Review of Systems


ROS: 


Stated complaint: HIGH BLOOD SUGAR


Other details as noted in HPI





Constitutional: fever (Subjective)


Eyes: denies: eye pain


ENT: denies: throat pain


Respiratory: no symptoms reported


Cardiovascular: denies: chest pain


Endocrine: no symptoms reported


Gastrointestinal: abdominal pain, nausea, vomiting


Genitourinary: denies: dysuria


Musculoskeletal: denies: back pain


Neurological: denies: headache





Physical Exam





- Physical Exam


Vital Signs: 


                                   Vital Signs











  20





  10:19


 


Temperature 97.5 F L


 


Pulse Rate 66


 


Respiratory 18





Rate 


 


Blood Pressure 146/90


 


O2 Sat by Pulse 100





Oximetry 











Physical Exam: 





GENERAL: The patient is well-developed well-nourished male lying on stretcher 

appearing to be in moderate discomfort. []


HEENT: Normocephalic.  Atraumatic.  Extraocular motions are intact. 


NECK: Supple.  Trachea midline


CHEST/LUNGS: Clear to auscultation.  There is no respiratory distress noted.


HEART/CARDIOVASCULAR: Regular.  There is no tachycardia.  There is no gallop rub

 or murmur.


ABDOMEN: Abdomen is soft, nontender the patient complains of discomfort in the 

midepigastric region.  Patient has normal bowel sounds.  There is no abdominal 

distention.


SKIN: There is no rash.  There is no edema.  There is no diaphoresis.


NEURO: The patient is awake, alert, and oriented.  The patient is cooperative.  

The patient has normal speech


MUSCULOSKELETAL: There is no evidence of acute injury.





ED Course


                                   Vital Signs











  20





  10:19


 


Temperature 97.5 F L


 


Pulse Rate 66


 


Respiratory 18





Rate 


 


Blood Pressure 146/90


 


O2 Sat by Pulse 100





Oximetry 














ED Medical Decision Making





- Lab Data


Result diagrams: 


                                 20 11:15





                                 20 11:15





                                Laboratory Tests











  20





  10:36 11:15 11:15


 


WBC   10.3 


 


RBC   5.05 H 


 


Hgb   15.1 


 


Hct   45.3 


 


MCV   90 


 


MCH   30 


 


MCHC   33 


 


RDW   12.6 L 


 


Plt Count   179 


 


Lymph % (Auto)   19.4 


 


Mono % (Auto)   4.5 


 


Eos % (Auto)   0.2 


 


Baso % (Auto)   0.4 


 


Lymph #   2.0 


 


Mono #   0.5 


 


Eos #   0.0 


 


Baso #   0.0 


 


Seg Neutrophils %   75.5 H 


 


Seg Neutrophils #   7.8 H 


 


VBG pH   


 


Sodium    138


 


Potassium    4.2


 


Chloride    100.2


 


Carbon Dioxide    19 L


 


Anion Gap    23


 


BUN    13


 


Creatinine    1.1


 


Estimated GFR    > 60


 


BUN/Creatinine Ratio    12


 


Glucose    415 H


 


POC Glucose  338 H  


 


Calcium    10.1


 


Total Bilirubin    0.30


 


AST    22


 


ALT    24


 


Alkaline Phosphatase    109


 


Total Protein    8.1


 


Albumin    4.3


 


Albumin/Globulin Ratio    1.1


 


Lipase    5 L














  20





  11:15


 


WBC 


 


RBC 


 


Hgb 


 


Hct 


 


MCV 


 


MCH 


 


MCHC 


 


RDW 


 


Plt Count 


 


Lymph % (Auto) 


 


Mono % (Auto) 


 


Eos % (Auto) 


 


Baso % (Auto) 


 


Lymph # 


 


Mono # 


 


Eos # 


 


Baso # 


 


Seg Neutrophils % 


 


Seg Neutrophils # 


 


VBG pH  7.287 L


 


Sodium 


 


Potassium 


 


Chloride 


 


Carbon Dioxide 


 


Anion Gap 


 


BUN 


 


Creatinine 


 


Estimated GFR 


 


BUN/Creatinine Ratio 


 


Glucose 


 


POC Glucose 


 


Calcium 


 


Total Bilirubin 


 


AST 


 


ALT 


 


Alkaline Phosphatase 


 


Total Protein 


 


Albumin 


 


Albumin/Globulin Ratio 


 


Lipase 














- Radiology Data


Radiology results: report reviewed (CT abdomen pelvis), image reviewed (CT 

abdomen pelvis)





Findings


[facility] [address] [place] [address] Cat

 Scan Report Signed Patient: SOPHIA TINAJERO MR#: [id number] : 

1990 Acct:[id number] Age/Sex: 29 / M ADM Date: 20 Loc: ED 

Attending Dr: Ordering Physician: JEOVANY CRABTREE MD Date of Service: 20 

Procedure(s): CT abdomen pelvis w con Accession Number(s): [id number] cc: JEOVANY CRABTREE MD CT abdomen pelvis w con INDICATION: MAIN: Epigastric abdominal pain 

nausea vomiting 100 ML OMNI 300. TECHNIQUE: All CT scans at this location are 

performed using CT dose reduction for ALARA by means of automated exposure con

trol. COMPARISON: None available. FINDINGS: Lung bases are clear. Liver, 

gallbladder, spleen, pancreas, kidneys and adrenals appear negative. Abdominal 

aorta is normal in size. No adenopathy. Pelvis Normal appendix. No free fluid or

 inflammatory change. Urinary bladder appears negative. No significant skeletal 

lesions. IMPRESSION: 1. Negative study. Signer Name: Adrian Ying MD Signed: 

2020 1:50 PM Workstation Name: [id number] Transcribed By: TM Dictated By: 

Adrian Ying MD Electronically Authenticated By: Adrian Ying MD Signed 

Date/Time: 20 1350 DD/DT: 20 1348 TD/TT: 











- Differential Diagnosis


Gastroparesis, pancreatitis, small bowel obstruction, peptic ulcer disease


Critical care attestation.: 


If time is entered above; I have spent that time in minutes in the direct care 

of this critically ill patient, excluding procedure time.








ED Disposition


Clinical Impression: 


 DKA (diabetic ketoacidoses), Abdominal pain





Disposition:  OP ADMIT IP TO THIS HOSP


Is pt being admited?: Yes


Does the pt Need Aspirin: No


Condition: Fair


Instructions:  Diabetic Ketoacidosis (ED)


Time of Disposition: 14:14 (Hospitalist paged (Dr Leon))

## 2020-04-23 NOTE — CAT SCAN REPORT
CT abdomen pelvis w con



INDICATION:

MAIN: Epigastric abdominal pain nausea vomiting 100  ML  OMNI 300.



TECHNIQUE:

All CT scans at this location are performed using CT dose reduction for ALARA by means of automated e
xposure control. 



COMPARISON:

None available.



FINDINGS:

Lung bases are clear. Liver, gallbladder, spleen, pancreas, kidneys and adrenals appear negative. Abd
ominal aorta is normal in size. No adenopathy.



Pelvis



Normal appendix. No free fluid or inflammatory change. Urinary bladder appears negative.



No significant skeletal lesions.



IMPRESSION:

1. Negative study. 



Signer Name: Adrian Ying MD 

Signed: 4/23/2020 1:50 PM

Workstation Name: [id number]

## 2020-04-24 LAB
BUN SERPL-MCNC: 11 MG/DL (ref 9–20)
BUN SERPL-MCNC: 12 MG/DL (ref 9–20)
BUN SERPL-MCNC: 12 MG/DL (ref 9–20)
BUN/CREAT SERPL: 12 %
BUN/CREAT SERPL: 13 %
BUN/CREAT SERPL: 13 %
CALCIUM SERPL-MCNC: 10 MG/DL (ref 8.4–10.2)
CALCIUM SERPL-MCNC: 9.2 MG/DL (ref 8.4–10.2)
CALCIUM SERPL-MCNC: 9.7 MG/DL (ref 8.4–10.2)
HEMOLYSIS INDEX: 12
HEMOLYSIS INDEX: 12
HEMOLYSIS INDEX: 3

## 2020-04-24 RX ADMIN — ONDANSETRON PRN MG: 2 INJECTION INTRAMUSCULAR; INTRAVENOUS at 07:05

## 2020-04-24 RX ADMIN — Medication SCH ML: at 09:38

## 2020-04-24 RX ADMIN — INSULIN LISPRO SCH: 100 INJECTION, SOLUTION INTRAVENOUS; SUBCUTANEOUS at 17:48

## 2020-04-24 RX ADMIN — INSULIN HUMAN SCH UNIT: 100 INJECTION, SUSPENSION SUBCUTANEOUS at 17:36

## 2020-04-24 RX ADMIN — FAMOTIDINE SCH MG: 10 INJECTION, SOLUTION INTRAVENOUS at 10:00

## 2020-04-24 RX ADMIN — ONDANSETRON PRN MG: 2 INJECTION INTRAMUSCULAR; INTRAVENOUS at 17:35

## 2020-04-24 RX ADMIN — INSULIN LISPRO SCH UNIT: 100 INJECTION, SOLUTION INTRAVENOUS; SUBCUTANEOUS at 17:47

## 2020-04-24 RX ADMIN — METOCLOPRAMIDE PRN MG: 5 INJECTION, SOLUTION INTRAMUSCULAR; INTRAVENOUS at 18:30

## 2020-04-24 RX ADMIN — ONDANSETRON PRN MG: 2 INJECTION INTRAMUSCULAR; INTRAVENOUS at 04:19

## 2020-04-24 RX ADMIN — ONDANSETRON PRN MG: 2 INJECTION INTRAMUSCULAR; INTRAVENOUS at 10:54

## 2020-04-24 RX ADMIN — ONDANSETRON PRN MG: 2 INJECTION INTRAMUSCULAR; INTRAVENOUS at 21:10

## 2020-04-24 RX ADMIN — FAMOTIDINE SCH MG: 10 INJECTION, SOLUTION INTRAVENOUS at 21:10

## 2020-04-24 RX ADMIN — ONDANSETRON PRN MG: 2 INJECTION INTRAMUSCULAR; INTRAVENOUS at 14:36

## 2020-04-24 RX ADMIN — ONDANSETRON PRN MG: 2 INJECTION INTRAMUSCULAR; INTRAVENOUS at 01:12

## 2020-04-24 RX ADMIN — DEXTROSE, SODIUM CHLORIDE, AND POTASSIUM CHLORIDE SCH MLS/HR: 5; .45; .15 INJECTION INTRAVENOUS at 14:39

## 2020-04-24 NOTE — PROGRESS NOTE
Assessment and Plan





- Patient Problems


(1) DKA (diabetic ketoacidoses)


Current Visit: Yes   Status: Acute   


Qualifiers: 


   Diabetes mellitus type: type 1 


Plan to address problem: 


Improving


Still nauseous


Cont close monitoring


Trabsfer to Floor if ok by intensivist








(2) Gastritis


Current Visit: Yes   Status: Acute   


Plan to address problem: 


IV protonis and IV fluids








(3) DVT prophylaxis


Current Visit: No   Status: Acute   


Plan to address problem: 


On Heparin








Subjective


Date of service: 04/24/20


Principal diagnosis: DKA


Interval history: 


Improving


Less Nausea








Objective





- Constitutional


Vitals: 


                               Vital Signs - 12hr











  04/24/20 04/24/20 04/24/20





  05:21 05:31 05:41


 


Temperature   


 


Pulse Rate 85 89 91 H


 


Pulse Rate [   





From Monitor]   


 


Respiratory 13 11 L 11 L





Rate   


 


Respiratory   





Rate [Abdomen]   


 


Blood Pressure 120/51 120/51 120/51


 


O2 Sat by Pulse 99 99 100





Oximetry   














  04/24/20 04/24/20 04/24/20





  05:51 06:01 06:11


 


Temperature   


 


Pulse Rate 91 H 94 H 97 H


 


Pulse Rate [   





From Monitor]   


 


Respiratory 14 13 23





Rate   


 


Respiratory   





Rate [Abdomen]   


 


Blood Pressure 114/49 92/53 92/53


 


O2 Sat by Pulse 97 99 99





Oximetry   














  04/24/20 04/24/20 04/24/20





  06:21 06:31 06:41


 


Temperature   


 


Pulse Rate 80 82 74


 


Pulse Rate [   





From Monitor]   


 


Respiratory 16 14 25 H





Rate   


 


Respiratory   





Rate [Abdomen]   


 


Blood Pressure 119/57 123/58 123/58


 


O2 Sat by Pulse 99 91 94





Oximetry   














  04/24/20 04/24/20 04/24/20





  06:51 07:01 07:11


 


Temperature   


 


Pulse Rate 73 82 75


 


Pulse Rate [   





From Monitor]   


 


Respiratory 15 13 20





Rate   


 


Respiratory   





Rate [Abdomen]   


 


Blood Pressure 118/57 118/57 118/57


 


O2 Sat by Pulse 97 95 95





Oximetry   














  04/24/20 04/24/20 04/24/20





  07:21 07:31 07:41


 


Temperature   


 


Pulse Rate 72 70 75


 


Pulse Rate [   





From Monitor]   


 


Respiratory 16 12 12





Rate   


 


Respiratory   





Rate [Abdomen]   


 


Blood Pressure 118/57 118/57 118/57


 


O2 Sat by Pulse 96 98 99





Oximetry   














  04/24/20 04/24/20 04/24/20





  07:51 08:00 08:01


 


Temperature  98.3 F 


 


Pulse Rate 85  79


 


Pulse Rate [  69 





From Monitor]   


 


Respiratory 12 13 15





Rate   


 


Respiratory   





Rate [Abdomen]   


 


Blood Pressure 118/57  129/69


 


O2 Sat by Pulse 100 99 94





Oximetry   














  04/24/20 04/24/20 04/24/20





  08:11 08:21 08:31


 


Temperature   


 


Pulse Rate 76 66 71


 


Pulse Rate [   





From Monitor]   


 


Respiratory 13 16 15





Rate   


 


Respiratory   





Rate [Abdomen]   


 


Blood Pressure 129/69 129/69 129/69


 


O2 Sat by Pulse 98 98 99





Oximetry   














  04/24/20 04/24/20 04/24/20





  08:41 08:51 09:01


 


Temperature   


 


Pulse Rate 66 98 H 73


 


Pulse Rate [   





From Monitor]   


 


Respiratory 16 13 16





Rate   


 


Respiratory   





Rate [Abdomen]   


 


Blood Pressure 129/69 129/69 129/69


 


O2 Sat by Pulse 97 99 99





Oximetry   














  04/24/20 04/24/20 04/24/20





  09:11 09:21 09:31


 


Temperature   


 


Pulse Rate 75 71 106 H


 


Pulse Rate [   





From Monitor]   


 


Respiratory 15 22 13





Rate   


 


Respiratory   





Rate [Abdomen]   


 


Blood Pressure 129/69 129/69 129/69


 


O2 Sat by Pulse 96 95 100





Oximetry   














  04/24/20 04/24/20 04/24/20





  09:41 09:51 10:00


 


Temperature   


 


Pulse Rate 84 72 76


 


Pulse Rate [   





From Monitor]   


 


Respiratory 16 17 12





Rate   


 


Respiratory   27 H





Rate [Abdomen]   


 


Blood Pressure 129/69 129/69 109/61


 


O2 Sat by Pulse 98 99 95





Oximetry   














  04/24/20 04/24/20 04/24/20





  10:11 10:21 10:31


 


Temperature   


 


Pulse Rate 72 69 75


 


Pulse Rate [   





From Monitor]   


 


Respiratory   





Rate   


 


Respiratory   





Rate [Abdomen]   


 


Blood Pressure 109/61 106/53 107/59


 


O2 Sat by Pulse 95 96 99





Oximetry   














  04/24/20 04/24/20 04/24/20





  10:41 10:51 11:01


 


Temperature   


 


Pulse Rate 70 77 81


 


Pulse Rate [   





From Monitor]   


 


Respiratory   





Rate   


 


Respiratory   





Rate [Abdomen]   


 


Blood Pressure 107/59 101/49 101/49


 


O2 Sat by Pulse 97 98 99





Oximetry   














  04/24/20 04/24/20 04/24/20





  11:11 11:21 11:31


 


Temperature   


 


Pulse Rate 76 71 80


 


Pulse Rate [   





From Monitor]   


 


Respiratory   14





Rate   


 


Respiratory   





Rate [Abdomen]   


 


Blood Pressure 101/49 101/49 101/49


 


O2 Sat by Pulse 97 99 98





Oximetry   














  04/24/20 04/24/20 04/24/20





  11:41 11:51 12:00


 


Temperature   98.5 F


 


Pulse Rate 76 71 


 


Pulse Rate [   73





From Monitor]   


 


Respiratory 12 17 14





Rate   


 


Respiratory   





Rate [Abdomen]   


 


Blood Pressure 150/119 150/119 


 


O2 Sat by Pulse 98 98 99





Oximetry   














  04/24/20 04/24/20 04/24/20





  12:01 12:11 12:21


 


Temperature   


 


Pulse Rate 75 71 76


 


Pulse Rate [   





From Monitor]   


 


Respiratory 10 L 17 17





Rate   


 


Respiratory   





Rate [Abdomen]   


 


Blood Pressure 150/119 150/119 118/60


 


O2 Sat by Pulse 100 97 99





Oximetry   














  04/24/20 04/24/20 04/24/20





  12:31 12:41 12:51


 


Temperature   


 


Pulse Rate 79 77 69


 


Pulse Rate [   





From Monitor]   


 


Respiratory 11 L 17 14





Rate   


 


Respiratory   





Rate [Abdomen]   


 


Blood Pressure 118/60 118/60 115/55


 


O2 Sat by Pulse 99 97 100





Oximetry   














  04/24/20 04/24/20 04/24/20





  13:01 13:11 13:21


 


Temperature   


 


Pulse Rate 79 80 86


 


Pulse Rate [   





From Monitor]   


 


Respiratory 20 17 21





Rate   


 


Respiratory   





Rate [Abdomen]   


 


Blood Pressure 124/68 124/68 124/68


 


O2 Sat by Pulse 98 99 99





Oximetry   














  04/24/20 04/24/20 04/24/20





  13:31 13:41 13:51


 


Temperature   


 


Pulse Rate 100 H 88 76


 


Pulse Rate [   





From Monitor]   


 


Respiratory 18 15 13





Rate   


 


Respiratory   





Rate [Abdomen]   


 


Blood Pressure 124/68 124/68 124/68


 


O2 Sat by Pulse 100 100 95





Oximetry   














  04/24/20 04/24/20 04/24/20





  14:01 14:11 14:21


 


Temperature   


 


Pulse Rate 74 78 76


 


Pulse Rate [   





From Monitor]   


 


Respiratory 19 20 16





Rate   


 


Respiratory   





Rate [Abdomen]   


 


Blood Pressure 124/68 124/68 124/68


 


O2 Sat by Pulse 98 98 98





Oximetry   














  04/24/20 04/24/20 04/24/20





  14:31 14:41 14:51


 


Temperature   


 


Pulse Rate 80 68 73


 


Pulse Rate [   





From Monitor]   


 


Respiratory 14 13 17





Rate   


 


Respiratory   





Rate [Abdomen]   


 


Blood Pressure 124/68 124/68 120/60


 


O2 Sat by Pulse 96 98 97





Oximetry   














  04/24/20 04/24/20





  15:01 15:11


 


Temperature  


 


Pulse Rate 73 76


 


Pulse Rate [  





From Monitor]  


 


Respiratory 14 14





Rate  


 


Respiratory  





Rate [Abdomen]  


 


Blood Pressure 111/60 111/60


 


O2 Sat by Pulse 100 98





Oximetry  











General appearance: Present: no acute distress, well-nourished





- EENT


Eyes: PERRL, EOM intact


ENT: hearing intact, clear oral mucosa


Ears: bilateral: normal





- Neck


Neck: supple, normal ROM





- Respiratory


Respiratory effort: normal


Respiratory: bilateral: CTA





- Breasts


Breasts: normal





- Cardiovascular


Heart rate: 78


Rhythm: regular


Heart Sounds: Present: S1 & S2.  Absent: gallop, rub


Extremities: pulses intact, No edema, normal color, Full ROM





- Gastrointestinal


General gastrointestinal: Present: soft, non-tender, non-distended, normal bowel

 sounds


Rectal Exam: deferred





- Genitourinary


Male genitourinary: normal





- Integumentary


Integumentary: clear, warm, dry





- Musculoskeletal


Musculoskeletal: 1, strength equal bilaterally





- Neurologic


Neurologic: moves all extremities





- Psychiatric


Psychiatric: memory intact, appropriate mood/affect, intact judgment & insight





- Allied health notes


Allied health notes reviewed: nursing, case management





- Labs


CBC & Chem 7: 


                                 04/23/20 11:15





                                 04/24/20 14:55


Labs: 


                              Abnormal lab results











  04/23/20 04/23/20 04/23/20 Range/Units





  16:24 17:28 18:03 


 


Potassium     (3.6-5.0)  mmol/L


 


Carbon Dioxide     (22-30)  mmol/L


 


Glucose     ()  mg/dL


 


POC Glucose  280 H  293 H  229 H  ()  


 


Hemoglobin A1c     (4-6)  %


 


Phosphorus     (2.5-4.5)  mg/dL


 


Magnesium     (1.7-2.3)  mg/dL














  04/23/20 04/23/20 04/23/20 Range/Units





  19:47 20:47 20:58 


 


Potassium     (3.6-5.0)  mmol/L


 


Carbon Dioxide    20 L  (22-30)  mmol/L


 


Glucose    150 H  ()  mg/dL


 


POC Glucose  176 H  149 H   ()  


 


Hemoglobin A1c     (4-6)  %


 


Phosphorus     (2.5-4.5)  mg/dL


 


Magnesium     (1.7-2.3)  mg/dL














  04/23/20 04/23/20 04/23/20 Range/Units





  20:58 20:58 21:17 


 


Potassium     (3.6-5.0)  mmol/L


 


Carbon Dioxide     (22-30)  mmol/L


 


Glucose     ()  mg/dL


 


POC Glucose    142 H  ()  


 


Hemoglobin A1c  8.5 H    (4-6)  %


 


Phosphorus   1.70 L   (2.5-4.5)  mg/dL


 


Magnesium   1.60 L   (1.7-2.3)  mg/dL














  04/23/20 04/23/20 04/24/20 Range/Units





  22:08 23:05 00:21 


 


Potassium     (3.6-5.0)  mmol/L


 


Carbon Dioxide     (22-30)  mmol/L


 


Glucose     ()  mg/dL


 


POC Glucose  163 H  162 H  187 H  ()  


 


Hemoglobin A1c     (4-6)  %


 


Phosphorus     (2.5-4.5)  mg/dL


 


Magnesium     (1.7-2.3)  mg/dL














  04/24/20 04/24/20 04/24/20 Range/Units





  01:19 02:35 03:19 


 


Potassium     (3.6-5.0)  mmol/L


 


Carbon Dioxide     (22-30)  mmol/L


 


Glucose     ()  mg/dL


 


POC Glucose  196 H  160 H  146 H  ()  


 


Hemoglobin A1c     (4-6)  %


 


Phosphorus     (2.5-4.5)  mg/dL


 


Magnesium     (1.7-2.3)  mg/dL














  04/24/20 04/24/20 04/24/20 Range/Units





  04:08 04:08 04:25 


 


Potassium  3.5 L    (3.6-5.0)  mmol/L


 


Carbon Dioxide     (22-30)  mmol/L


 


Glucose  123 H    ()  mg/dL


 


POC Glucose    116 H  ()  


 


Hemoglobin A1c     (4-6)  %


 


Phosphorus   2.00 L   (2.5-4.5)  mg/dL


 


Magnesium     (1.7-2.3)  mg/dL














  04/24/20 04/24/20 04/24/20 Range/Units





  06:20 06:52 08:41 


 


Potassium     (3.6-5.0)  mmol/L


 


Carbon Dioxide     (22-30)  mmol/L


 


Glucose     ()  mg/dL


 


POC Glucose  138 H  165 H  185 H  ()  


 


Hemoglobin A1c     (4-6)  %


 


Phosphorus     (2.5-4.5)  mg/dL


 


Magnesium     (1.7-2.3)  mg/dL














  04/24/20 04/24/20 04/24/20 Range/Units





  09:45 11:02 11:59 


 


Potassium     (3.6-5.0)  mmol/L


 


Carbon Dioxide     (22-30)  mmol/L


 


Glucose     ()  mg/dL


 


POC Glucose  201 H  148 H  143 H  ()  


 


Hemoglobin A1c     (4-6)  %


 


Phosphorus     (2.5-4.5)  mg/dL


 


Magnesium     (1.7-2.3)  mg/dL














  04/24/20 04/24/20 Range/Units





  13:20 14:55 


 


Potassium    (3.6-5.0)  mmol/L


 


Carbon Dioxide    (22-30)  mmol/L


 


Glucose  122 H  172 H  ()  mg/dL


 


POC Glucose    ()  


 


Hemoglobin A1c    (4-6)  %


 


Phosphorus    (2.5-4.5)  mg/dL


 


Magnesium    (1.7-2.3)  mg/dL

## 2020-04-24 NOTE — HISTORY AND PHYSICAL REPORT
CHIEF COMPLAINT:  Nausea and vomiting for 2 days.



HISTORY OF PRESENT ILLNESS:  A 29-year-old  male with history of

juvenile type 1 diabetes, on insulin 70/30 three times a day, comes in for

nausea, vomiting for 2 days.  The patient states he has been vomiting 3-4 times

a day.  The patient states that he has been taking insulin regularly.  The

patient has epigastric pain and vomiting for 3-4 times a day.  The patient

states he has been very compliant with his day of medications.



PAST MEDICAL HISTORY:  Congestive heart failure, diabetes, asthma and

gastroparesis.



PAST SURGICAL HISTORY:  None.



FAMILY HISTORY:  No significant family history.



SOCIAL HISTORY:  Former smoker.  Smokes marijuana.



CURRENT MEDICATIONS:  Insulin Lantus 24 units at nighttime and Humalog supposed

to be before each meal.  Flexeril 10 mg twice a day.



REVIEW OF SYSTEMS:  Significant for nausea, vomiting, and epigastric pain for 2

days.



PHYSICAL EXAMINATION:

GENERAL:  Young male, slightly altered sensorium.

VITAL SIGNS:  Blood pressure is 129/69, pulse is 79, temperature is 98.

HEENT:  Unremarkable.  Pupils are equal and reactive.

NECK:  Supple, no lymphadenopathy, no thyromegaly.

LUNGS:  Clear to auscultation and percussion.  Good air entry.

CARDIOVASCULAR SYSTEM:  S1, S2 heard.  No gallop, no murmur, no rub.  Apical

impulse in left fifth intercostal space and midclavicular line.

ABDOMEN:  Soft.  Slight tenderness in the epigastric region.  Bowel sounds are

normal.

EXTREMITIES:  Good pedal pulses.

CENTRAL NERVOUS SYSTEM:  Alert and oriented x 4.  Slightly lethargic.



LABORATORY DATA:  Significant for white count of 10,300, H and H is normal. 

Sodium is 132, potassium is 4.2, bicarbonate is 20, glucose of 415.  A1c is 8.5.

 Urine specific gravity is 1.047.  Ketones are 20.



ASSESSMENT AND PLAN:

1.  Diabetic ketoacidosis.  Diabetic ketoacidosis protocol initiated.  Once the

blood glucose levels are near normal, we will downgrade him to fluid.

2.  Hypophosphatemia, supplemented.

3.  Hypomagnesemia, supplemented.

4.  Metabolic acidosis.  IV fluids for now.

5.  Deep venous thrombosis prophylaxis, heparin 5000 q. 12.



CRITICAL CARE STATEMENT:  32 minutes of critical care time.





DD: 04/24/2020 09:00

DT: 04/24/2020 09:56

JOB# [id number]  [id number]

GRACIA/EDEN

## 2020-04-24 NOTE — EVENT NOTE
Date: 04/24/20





Patient admitted with DKA overnight.  Anion Gap has closed.  Required 67 units 

of insulin so will place on 70/30 17 BID and give a dose now.  Per patient takes

30 units TID.  I am not going to start that regimen.  placed on high dose 

sliding scale and will order lunch.  If able to keep down, will transfer to 

floor after lunch.

## 2020-04-25 LAB
BASOPHILS # (AUTO): 0 K/MM3 (ref 0–0.1)
BASOPHILS NFR BLD AUTO: 0.3 % (ref 0–1.8)
BUN SERPL-MCNC: 10 MG/DL (ref 9–20)
BUN/CREAT SERPL: 13 %
CALCIUM SERPL-MCNC: 8.8 MG/DL (ref 8.4–10.2)
EOSINOPHIL # BLD AUTO: 0 K/MM3 (ref 0–0.4)
EOSINOPHIL NFR BLD AUTO: 0 % (ref 0–4.3)
HCT VFR BLD CALC: 40.5 % (ref 35.5–45.6)
HEMOLYSIS INDEX: 21
HGB BLD-MCNC: 13.6 GM/DL (ref 11.8–15.2)
LYMPHOCYTES # BLD AUTO: 2.8 K/MM3 (ref 1.2–5.4)
LYMPHOCYTES NFR BLD AUTO: 25.9 % (ref 13.4–35)
MCHC RBC AUTO-ENTMCNC: 34 % (ref 32–34)
MCV RBC AUTO: 89 FL (ref 84–94)
MONOCYTES # (AUTO): 1.1 K/MM3 (ref 0–0.8)
MONOCYTES % (AUTO): 10.5 % (ref 0–7.3)
PLATELET # BLD: 211 K/MM3 (ref 140–440)
RBC # BLD AUTO: 4.53 M/MM3 (ref 3.65–5.03)

## 2020-04-25 RX ADMIN — ONDANSETRON PRN MG: 2 INJECTION INTRAMUSCULAR; INTRAVENOUS at 14:49

## 2020-04-25 RX ADMIN — FAMOTIDINE SCH MG: 10 INJECTION, SOLUTION INTRAVENOUS at 09:10

## 2020-04-25 RX ADMIN — ONDANSETRON PRN MG: 2 INJECTION INTRAMUSCULAR; INTRAVENOUS at 04:23

## 2020-04-25 RX ADMIN — METOCLOPRAMIDE PRN MG: 5 INJECTION, SOLUTION INTRAMUSCULAR; INTRAVENOUS at 22:22

## 2020-04-25 RX ADMIN — METOCLOPRAMIDE PRN MG: 5 INJECTION, SOLUTION INTRAMUSCULAR; INTRAVENOUS at 01:59

## 2020-04-25 RX ADMIN — METOCLOPRAMIDE PRN MG: 5 INJECTION, SOLUTION INTRAMUSCULAR; INTRAVENOUS at 17:39

## 2020-04-25 RX ADMIN — Medication SCH ML: at 22:00

## 2020-04-25 RX ADMIN — INSULIN HUMAN SCH UNIT: 100 INJECTION, SUSPENSION SUBCUTANEOUS at 09:45

## 2020-04-25 RX ADMIN — ONDANSETRON PRN MG: 2 INJECTION INTRAMUSCULAR; INTRAVENOUS at 21:06

## 2020-04-25 RX ADMIN — INSULIN LISPRO SCH UNIT: 100 INJECTION, SOLUTION INTRAVENOUS; SUBCUTANEOUS at 09:09

## 2020-04-25 RX ADMIN — FAMOTIDINE SCH MG: 20 TABLET ORAL at 22:20

## 2020-04-25 RX ADMIN — SODIUM CHLORIDE SCH MLS/HR: 0.9 INJECTION, SOLUTION INTRAVENOUS at 17:40

## 2020-04-25 RX ADMIN — SODIUM CHLORIDE SCH MLS/HR: 0.9 INJECTION, SOLUTION INTRAVENOUS at 11:02

## 2020-04-25 RX ADMIN — INSULIN LISPRO SCH: 100 INJECTION, SOLUTION INTRAVENOUS; SUBCUTANEOUS at 16:30

## 2020-04-25 RX ADMIN — INSULIN LISPRO SCH: 100 INJECTION, SOLUTION INTRAVENOUS; SUBCUTANEOUS at 22:00

## 2020-04-25 RX ADMIN — INSULIN LISPRO SCH UNIT: 100 INJECTION, SOLUTION INTRAVENOUS; SUBCUTANEOUS at 12:12

## 2020-04-25 RX ADMIN — INSULIN HUMAN SCH: 100 INJECTION, SUSPENSION SUBCUTANEOUS at 17:00

## 2020-04-25 RX ADMIN — Medication SCH ML: at 11:03

## 2020-04-25 RX ADMIN — METOCLOPRAMIDE PRN MG: 5 INJECTION, SOLUTION INTRAMUSCULAR; INTRAVENOUS at 08:48

## 2020-04-25 NOTE — EVENT NOTE
Date: 04/25/20





Nursing requested to keep patient overnight as he was not eating or able to keep

anything down.  Continued IVF's.  Sugar this am was elevated in the 300's.  Will

increase 70/30 22 BID and give an additional 5 units now.  Stable for transfer 

to floor.  Spoke with charge and nursing staff.  Can go to [facility] unit.

## 2020-04-26 VITALS — SYSTOLIC BLOOD PRESSURE: 138 MMHG | DIASTOLIC BLOOD PRESSURE: 79 MMHG

## 2020-04-26 RX ADMIN — Medication SCH: at 10:51

## 2020-04-26 RX ADMIN — INSULIN HUMAN SCH UNIT: 100 INJECTION, SUSPENSION SUBCUTANEOUS at 10:03

## 2020-04-26 RX ADMIN — INSULIN LISPRO SCH UNIT: 100 INJECTION, SOLUTION INTRAVENOUS; SUBCUTANEOUS at 09:09

## 2020-04-26 RX ADMIN — Medication SCH ML: at 06:42

## 2020-04-26 RX ADMIN — INSULIN LISPRO SCH UNIT: 100 INJECTION, SOLUTION INTRAVENOUS; SUBCUTANEOUS at 12:26

## 2020-04-26 RX ADMIN — FAMOTIDINE SCH MG: 20 TABLET ORAL at 10:50

## 2020-04-26 NOTE — PROGRESS NOTE
Assessment and Plan





- Patient Problems


(1) DKA (diabetic ketoacidoses)


Current Visit: Yes   Status: Acute   


Qualifiers: 


   Diabetes mellitus type: type 1 


Plan to address problem: 


Improving


Still nauseous


Cont close monitoring











(2) Gastritis


Current Visit: Yes   Status: Acute   


Plan to address problem: 


IV protonis and IV fluids








(3) DVT prophylaxis


Current Visit: No   Status: Acute   


Plan to address problem: 


On Heparin








Subjective


Date of service: 04/25/20


Principal diagnosis: DKA


Interval history: 


Improving


Less Nausea








Objective





- Constitutional


Vitals: 


                               Vital Signs - 12hr











  04/26/20 04/26/20 04/26/20





  02:00 02:31 07:34


 


Temperature 98.8 F  99.0 F


 


Pulse Rate  71 73


 


Respiratory 18  21





Rate   


 


Blood Pressure 137/93  


 


Blood Pressure   138/79





[Left]   


 


O2 Sat by Pulse  98 98





Oximetry   











General appearance: Present: no acute distress, well-nourished





- EENT


Eyes: PERRL, EOM intact


ENT: hearing intact, clear oral mucosa


Ears: bilateral: normal





- Neck


Neck: supple, normal ROM





- Respiratory


Respiratory effort: normal


Respiratory: bilateral: CTA





- Breasts


Breasts: normal





- Cardiovascular


Heart rate: 78


Rhythm: regular


Heart Sounds: Present: S1 & S2.  Absent: gallop, rub


Extremities: no ischemia, pulses intact, No edema, normal color, Full ROM





- Gastrointestinal


General gastrointestinal: Present: soft, non-tender, non-distended, normal bowel

 sounds





- Genitourinary


Male genitourinary: normal





- Integumentary


Integumentary: clear, warm, dry





- Musculoskeletal


Musculoskeletal: 1, strength equal bilaterally





- Neurologic


Neurologic: moves all extremities





- Psychiatric


Psychiatric: memory intact, appropriate mood/affect, intact judgment & insight





- Labs


CBC & Chem 7: 


                                 04/25/20 15:07





                                 04/25/20 15:07


Labs: 


                              Abnormal lab results











  04/25/20 04/25/20 04/25/20 Range/Units





  11:36 15:07 15:07 


 


RDW   12.2 L   (13.2-15.2)  %


 


Mono % (Auto)   10.5 H   (0.0-7.3)  %


 


Mono #   1.1 H   (0.0-0.8)  K/mm3


 


Potassium    3.5 L  (3.6-5.0)  mmol/L


 


Carbon Dioxide    21 L  (22-30)  mmol/L


 


Glucose    116 H  ()  mg/dL


 


POC Glucose  230 H    ()  














  04/25/20 04/26/20 04/26/20 Range/Units





  21:54 07:59 11:31 


 


RDW     (13.2-15.2)  %


 


Mono % (Auto)     (0.0-7.3)  %


 


Mono #     (0.0-0.8)  K/mm3


 


Potassium     (3.6-5.0)  mmol/L


 


Carbon Dioxide     (22-30)  mmol/L


 


Glucose     ()  mg/dL


 


POC Glucose  130 H  270 H  257 H  ()

## 2020-04-26 NOTE — DISCHARGE SUMMARY
Providers





- Providers


Date of Admission: 


04/23/20 14:34





Date of discharge: 04/26/20


Attending physician: 


MICHELE BARRERA





                                        





04/23/20 18:37


Consult to Dietitian/Nutrition [CONS] Routine 


   Physician Instructions: 


   Reason For Exam: DKA


   Reason for Consult: Nutrition Recommendations


   Reason for Consult: Diet education











Primary care physician: 


[facility], [place]








Hospitalization


Condition: Fair


Hospital course: 


 Patient was admitted to ICU for DKA.  Patient blood glucose was 415 and bicarb 

was 19 and anion gap of 23.  Patient did well with IV fluids IV insulin and 

frequent Accu-Cheks.  And coverage.  Patient was nauseous and secondary to 

patient was symptomatically treated.  Got better improved.  Wanted to go home on

 26th April.  Patient signed out AMA without prescriptions.  Patient says he was

 in a rush to go home.  Try to contact the patient on phone but phone not 

available.  Not ringing.  No discharge diagnoses DKA metabolic acidosis and 

gastritis.


(1) DKA (diabetic ketoacidoses)


Current Visit: Yes   Status: Acute   


Qualifiers: 


   Diabetes mellitus type: type 1 


Plan to address problem: 


Improving


Still nauseous


Cont close monitoring


Patient to take insulin 70/30 25 units twice a day.  Patient to be counseled.


Patient was called at home to inform but did not  the phone.











(2) Gastritis


Current Visit: Yes   Status: Acute   


Plan to address problem: 


 oral Protonix at this oral Protonix at discharge.








(3) Metabolic acidosis


IV fluids


IV insulin.


Gap corrected.


Disposition: DC-07 LEFT AGAINST MED ADVICE


Time spent for discharge: 40 minutes





- Discharge Diagnoses


(1) DKA (diabetic ketoacidoses)


Status: Suspected   


Qualifiers: 


   Diabetes mellitus type: type 1 


Comment: Improved,    





(2) Gastritis


Status: Acute   





(3) DVT prophylaxis


Status: Acute   





Core Measure Documentation





- Palliative Care


Palliative Care/ Comfort Measures: Not Applicable





- Core Measures


Any of the following diagnoses?: none





Exam





- Constitutional


Vitals: 


                                        











Temp Pulse Resp BP Pulse Ox


 


 99.0 F   73   21   138/79   98 


 


 04/26/20 07:34  04/26/20 07:34  04/26/20 07:34  04/26/20 07:34  04/26/20 07:34











General appearance: Present: no acute distress, well-nourished





- EENT


Eyes: Present: PERRL


ENT: hearing intact, clear oral mucosa





- Neck


Neck: Present: supple, normal ROM





- Respiratory


Respiratory effort: normal


Respiratory: bilateral: CTA





- Cardiovascular


Heart rate: 76


Rhythm: regular


Heart Sounds: Present: S1 & S2.  Absent: rub, click





- Extremities


Extremities: pulses symmetrical, No edema


Peripheral Pulses: within normal limits





- Abdominal


General gastrointestinal: Present: soft, non-tender, non-distended, normal bowel

 sounds


Male genitourinary: Present: normal





- Integumentary


Integumentary: Present: clear, warm, dry





- Musculoskeletal


Musculoskeletal: gait normal, strength equal bilaterally





- Psychiatric


Psychiatric: appropriate mood/affect, intact judgment & insight





- Neurologic


Neurologic: CNII-XII intact, moves all extremities





Plan


Follow up with: 


[facility], [place] [Primary Care Provider] - 3-5 Days


Forms:  AMA Form

## 2020-08-09 ENCOUNTER — HOSPITAL ENCOUNTER (INPATIENT)
Dept: HOSPITAL 5 - ED | Age: 30
LOS: 4 days | Discharge: HOME | DRG: 639 | End: 2020-08-13
Attending: INTERNAL MEDICINE | Admitting: INTERNAL MEDICINE
Payer: COMMERCIAL

## 2020-08-09 DIAGNOSIS — R11.2: ICD-10-CM

## 2020-08-09 DIAGNOSIS — E10.10: Primary | ICD-10-CM

## 2020-08-09 DIAGNOSIS — K29.70: ICD-10-CM

## 2020-08-09 DIAGNOSIS — K52.9: ICD-10-CM

## 2020-08-09 DIAGNOSIS — Z79.4: ICD-10-CM

## 2020-08-09 DIAGNOSIS — Z91.14: ICD-10-CM

## 2020-08-09 PROCEDURE — 85007 BL SMEAR W/DIFF WBC COUNT: CPT

## 2020-08-09 PROCEDURE — 84145 PROCALCITONIN (PCT): CPT

## 2020-08-09 PROCEDURE — 84100 ASSAY OF PHOSPHORUS: CPT

## 2020-08-09 PROCEDURE — 83036 HEMOGLOBIN GLYCOSYLATED A1C: CPT

## 2020-08-09 PROCEDURE — 93005 ELECTROCARDIOGRAM TRACING: CPT

## 2020-08-09 PROCEDURE — 36415 COLL VENOUS BLD VENIPUNCTURE: CPT

## 2020-08-09 PROCEDURE — 87040 BLOOD CULTURE FOR BACTERIA: CPT

## 2020-08-09 PROCEDURE — G0480 DRUG TEST DEF 1-7 CLASSES: HCPCS

## 2020-08-09 PROCEDURE — 80048 BASIC METABOLIC PNL TOTAL CA: CPT

## 2020-08-09 PROCEDURE — 85025 COMPLETE CBC W/AUTO DIFF WBC: CPT

## 2020-08-09 PROCEDURE — 82550 ASSAY OF CK (CPK): CPT

## 2020-08-09 PROCEDURE — 74177 CT ABD & PELVIS W/CONTRAST: CPT

## 2020-08-09 PROCEDURE — 82962 GLUCOSE BLOOD TEST: CPT

## 2020-08-09 PROCEDURE — 83735 ASSAY OF MAGNESIUM: CPT

## 2020-08-09 PROCEDURE — 80320 DRUG SCREEN QUANTALCOHOLS: CPT

## 2020-08-10 LAB
ANISOCYTOSIS BLD QL SMEAR: (no result)
BAND NEUTROPHILS # (MANUAL): 0.3 K/MM3
BUN SERPL-MCNC: 17 MG/DL (ref 9–20)
BUN SERPL-MCNC: 17 MG/DL (ref 9–20)
BUN SERPL-MCNC: 18 MG/DL (ref 9–20)
BUN SERPL-MCNC: 19 MG/DL (ref 9–20)
BUN SERPL-MCNC: 19 MG/DL (ref 9–20)
BUN SERPL-MCNC: 20 MG/DL (ref 9–20)
BUN SERPL-MCNC: 21 MG/DL (ref 9–20)
BUN/CREAT SERPL: 15 %
BUN/CREAT SERPL: 15 %
BUN/CREAT SERPL: 16 %
BUN/CREAT SERPL: 17 %
BUN/CREAT SERPL: 17 %
BUN/CREAT SERPL: 18 %
BUN/CREAT SERPL: 18 %
BURR CELLS BLD QL SMEAR: (no result)
CALCIUM SERPL-MCNC: 10.3 MG/DL (ref 8.4–10.2)
CALCIUM SERPL-MCNC: 9.1 MG/DL (ref 8.4–10.2)
CALCIUM SERPL-MCNC: 9.2 MG/DL (ref 8.4–10.2)
CALCIUM SERPL-MCNC: 9.3 MG/DL (ref 8.4–10.2)
CALCIUM SERPL-MCNC: 9.4 MG/DL (ref 8.4–10.2)
CALCIUM SERPL-MCNC: 9.4 MG/DL (ref 8.4–10.2)
CALCIUM SERPL-MCNC: 9.7 MG/DL (ref 8.4–10.2)
HCT VFR BLD CALC: 41.3 % (ref 35.5–45.6)
HEMOLYSIS INDEX: 10
HEMOLYSIS INDEX: 11
HEMOLYSIS INDEX: 121
HEMOLYSIS INDEX: 19
HEMOLYSIS INDEX: 31
HEMOLYSIS INDEX: 37
HEMOLYSIS INDEX: 44
HEMOLYSIS INDEX: 5
HEMOLYSIS INDEX: 6
HGB BLD-MCNC: 13 GM/DL (ref 11.8–15.2)
MCHC RBC AUTO-ENTMCNC: 32 % (ref 32–34)
MCV RBC AUTO: 95 FL (ref 84–94)
MYELOCYTES # (MANUAL): 0 K/MM3
PLATELET # BLD: 232 K/MM3 (ref 140–440)
POIKILOCYTOSIS BLD QL SMEAR: (no result)
PROMYELOCYTES # (MANUAL): 0 K/MM3
RBC # BLD AUTO: 4.33 M/MM3 (ref 3.65–5.03)
TOTAL CELLS COUNTED BLD: 100

## 2020-08-10 RX ADMIN — DEXTROSE, SODIUM CHLORIDE, AND POTASSIUM CHLORIDE SCH MLS/HR: 5; .45; .15 INJECTION INTRAVENOUS at 14:09

## 2020-08-10 RX ADMIN — METOCLOPRAMIDE PRN MG: 5 INJECTION, SOLUTION INTRAMUSCULAR; INTRAVENOUS at 19:45

## 2020-08-10 RX ADMIN — ENOXAPARIN SODIUM SCH MG: 100 INJECTION SUBCUTANEOUS at 22:10

## 2020-08-10 RX ADMIN — DEXTROSE, SODIUM CHLORIDE, AND POTASSIUM CHLORIDE SCH MLS/HR: 5; .45; .15 INJECTION INTRAVENOUS at 22:09

## 2020-08-10 RX ADMIN — Medication SCH: at 22:23

## 2020-08-10 NOTE — CAT SCAN REPORT
CT ABDOMEN AND PELVIS WITH CONTRAST



INDICATION / CLINICAL INFORMATION:

abdominal pain.



TECHNIQUE:

Axial CT images were obtained through the abdomen and pelvis after IV contrast.  All CT scans at this
 location are performed using CT dose reduction for ALARA by means of automated exposure control. 



COMPARISON:

4/23/2020.



FINDINGS:



LOWER CHEST: No significant abnormality.

LIVER: No significant abnormality.

GALLBLADDER: No significant abnormality.  

BILE DUCTS: No significant abnormality.

PANCREAS: No significant abnormality.

SPLEEN: No significant abnormality.

ADRENALS: No significant abnormality.

RIGHT KIDNEY / URETER: No significant abnormality.

LEFT KIDNEY / URETER: No significant abnormality.



STOMACH / SMALL BOWEL: No significant abnormality. 

COLON: No significant abnormality. 

APPENDIX: No significant abnormality.  

PERITONEUM: No free fluid. No free air. No fluid collection.

LYMPH NODES: No significant adenopathy.

AORTA / ARTERIES: No significant abnormality. 

IVC / VEINS: No significant abnormalit

URINARY BLADDER: Distended urinary bladder.

REPRODUCTIVE ORGANS: No significant abnormality.



ADDITIONAL FINDINGS: None.



SKELETAL SYSTEM: No significant abnormality.



IMPRESSION:

1. No significant abnormality. No significant interval change since 4/23/2020.



Signer Name: Buck Pereira MD 

Signed: 8/10/2020 8:51 AM

Workstation Name: [id number]

## 2020-08-10 NOTE — EMERGENCY DEPARTMENT REPORT
ED General Adult HPI





- General


Chief complaint: Hyperglycemia


Stated complaint: DIABETES


Time Seen by Provider: 08/10/20 06:18


Source: patient


Mode of arrival: Ambulatory


Limitations: No Limitations





- History of Present Illness


Initial comments: 





Mr. Major is a 29 years old male type 1 diabetes, noncompliant with his 

insulin.  Patient stated that last time he took insulin was 1 month ago.  

Patient was seen here yesterday in the ER diagnosed with DKA and recommended to 

be admitted to the hospital however patient was discharged after being evaluated

by hospitalist.  Patient returned back to the ER stating that his symptoms is 

getting worse and he became more diaphoretic and continued to vomit.  Patient 

still denying any fever or chills.  Patient is complaining of abdominal pain.  

No chest pain or shortness of breath.





- Related Data


                                  Previous Rx's











 Medication  Instructions  Recorded  Last Taken  Type


 


Insulin Glargine,Hum.rec.anlog 24 units SUB-Q HS #1 vial 01/28/19 Unknown Rx





[Lantus]    


 


Insulin Lispro [HumaLOG VIAL] 0 units SQ AC #1 vial 01/28/19 Unknown Rx


 


Ondansetron [Zofran ODT TAB] 8 mg PO Q8H PRN #90 tab.rapdis 01/28/19 Unknown Rx


 


Kristen Root [Kristen] 250 mg PO QID PRN #30 capsule 02/26/20 Unknown Rx


 


Pantoprazole [Protonix TAB] 40 mg PO QDAY #30 tablet 02/26/20 Unknown Rx


 


Promethazine [Phenergan] 25 mg MO Q6HR PRN #15 supp.rect 02/26/20 Unknown Rx


 


Cyclobenzaprine [Flexeril] 10 mg PO BID PRN #12 tablet 04/15/20 Unknown Rx


 


Naproxen [EC-Naproxen] 500 mg PO BID PRN #20 tablet. 04/15/20 Unknown Rx











                                    Allergies











Allergy/AdvReac Type Severity Reaction Status Date / Time


 


No Known Allergies Allergy   Unverified 04/25/20 17:21














ED Review of Systems


ROS: 


Stated complaint: DIABETES


Other details as noted in HPI





Comment: All other systems reviewed and negative


Constitutional: denies: chills, fever


Respiratory: denies: cough, shortness of breath, SOB with exertion, SOB at rest


Cardiovascular: denies: chest pain, palpitations


Gastrointestinal: abdominal pain, nausea, vomiting.  denies: diarrhea, 

constipation, hematemesis, melena, hematochezia


Musculoskeletal: denies: back pain


Neurological: weakness (Generalized.).  denies: headache, numbness, 

paresthesias, confusion, abnormal gait





ED Past Medical Hx





- Past Medical History


Previous Medical History?: Yes


Hx Congestive Heart Failure: Yes


Hx Diabetes: Yes (Type I)


Hx Asthma: Yes


Hx COPD: No


Hx HIV: No


Additional medical history: Gastroparesis





- Surgical History


Past Surgical History?: No


Hx Open Heart Surgery: No


Hx Cholecystectomy: No


Hx Appendectomy: No


Hx Breast Surgery: No





- Social History


Smoking Status: Never Smoker


Substance Use Type: None





- Medications


Home Medications: 


                                Home Medications











 Medication  Instructions  Recorded  Confirmed  Last Taken  Type


 


Insulin Glargine,Hum.rec.anlog 24 units SUB-Q HS #1 vial 01/28/19 04/23/20 

Unknown Rx





[Lantus]     


 


Insulin Lispro [HumaLOG VIAL] 0 units SQ AC #1 vial 01/28/19 04/23/20 Unknown Rx


 


Ondansetron [Zofran ODT TAB] 8 mg PO Q8H PRN #90 tab.rapdis 01/28/19 04/23/20 

Unknown Rx


 


Kristen Root [Kristen] 250 mg PO QID PRN #30 capsule 02/26/20 04/23/20 Unknown Rx


 


Pantoprazole [Protonix TAB] 40 mg PO QDAY #30 tablet 02/26/20 04/23/20 Unknown 

Rx


 


Promethazine [Phenergan] 25 mg MO Q6HR PRN #15 supp.rect 02/26/20 04/23/20 

Unknown Rx


 


Cyclobenzaprine [Flexeril] 10 mg PO BID PRN #12 tablet 04/15/20 04/23/20 Unknown

 Rx


 


Naproxen [EC-Naproxen] 500 mg PO BID PRN #20 tablet.dr 04/15/20 04/23/20 Unknown

Rx














ED Physical Exam





- General


Limitations: No Limitations


General appearance: alert, in distress





- Head


Head exam: Present: atraumatic, normocephalic, normal inspection





- ENT


ENT exam: Present: mucous membranes dry





- Neck


Neck exam: Present: normal inspection, full ROM.  Absent: tenderness, 

meningismus, lymphadenopathy, thyromegaly





- Respiratory


Respiratory exam: Present: normal lung sounds bilaterally





- Cardiovascular


Cardiovascular Exam: Present: tachycardia





- GI/Abdominal


GI/Abdominal exam: Present: soft, tenderness, normal bowel sounds.  Absent: 

distended, guarding, rebound, rigid, organomegaly, mass, bruit, pulsatile mass, 

hernia





- Extremities Exam


Extremities exam: Present: normal inspection, full ROM, normal capillary refill.

 Absent: pedal edema, calf tenderness





- Back Exam


Back exam: Present: normal inspection, full ROM.  Absent: CVA tenderness (R), 

CVA tenderness (L)





- Neurological Exam


Neurological exam: Present: alert, oriented X3, CN II-XII intact, normal gait, 

reflexes normal.  Absent: motor sensory deficit





- Psychiatric


Psychiatric exam: Present: normal mood, anxious





- Skin


Skin exam: Present: warm, dry, intact





ED Course


                                   Vital Signs











  08/09/20 08/10/20





  23:41 07:57


 


Temperature 98.6 F 


 


Pulse Rate 105 H 


 


Respiratory 18 18





Rate  


 


Blood Pressure 142/69 


 


O2 Sat by Pulse 99 98





Oximetry  














ED Medical Decision Making





- Lab Data


Result diagrams: 


                                 08/10/20 06:51





                                 08/10/20 06:51





- Radiology Data


Radiology results: report reviewed





- Medical Decision Making





Mr. Major is a 29 years old male type 1 diabetes, noncompliant with his 

insulin.  Patient stated that last time he took insulin was 1 month ago.  

Patient was seen here yesterday in the ER diagnosed with DKA and recommended to 

be admitted to the hospital however patient was discharged after being evaluated

by hospitalist.  Patient returned back to the ER stating that his symptoms is 

getting worse and he became more diaphoretic and continued to vomit.  Patient 

still denying any fever or chills.  Patient is complaining of abdominal pain.  

No chest pain or shortness of breath.





Patient received Reglan for nausea and vomiting.  Patient started on normal 

saline.  Insulin drip restarted.  Patient anion gap is 34 indicating significant

DKA.  Patient white blood cells increased to 25,000 with diffusely tender 

abdomen.  Patient received Zosyn and a CT abdomen and pelvis showed no acute 

abnormalities.  Patient had a chest x-ray yesterday which was negative for 

infection also a negative UA.  I discussed the patient with Dr. Leon, he agreed 

to admit the patient to the intensive care unit for further management.


Critical Care Time: Yes


Critical care time in (mins) excluding proc time.: 30


Critical care attestation.: 


If time is entered above; I have spent that time in minutes in the direct care 

of this critically ill patient, excluding procedure time.








ED Disposition


Clinical Impression: 


 DKA (diabetic ketoacidoses), Acute nausea with nonbilious vomiting, Abdominal 

pain





Disposition: DC-09 OP ADMIT IP TO THIS HOSP


Is pt being admited?: Yes


Condition: Stable


Instructions:  Diabetic Ketoacidosis (ED)


Referrals: 


[facility],MD [Primary Care Provider] - 3-5 Days

## 2020-08-10 NOTE — HISTORY AND PHYSICAL REPORT
History of Present Illness


Date of examination: 08/10/20


Date of admission: 


08/10/20 09:05





Chief complaint: 





DKA


History of present illness: 





Mr. Major is a 29 years old male type 1 diabetes, noncompliant with his 

insulin.  Patient stated that last time he took insulin was 1 month ago.  

Patient was seen here yesterday in the ER diagnosed with DKA and recommended to 

be admitted to the hospital however patient was discharged after being evaluated

by hospitalist.  Patient returned back to the ER stating that his symptoms is 

getting worse and he became more diaphoretic and continued to vomit.  Patient 

still denying any fever or chills.  Patient is complaining of abdominal pain.  

No chest pain or shortness of breath.  Patient had a previous admission for DKA 

on April of this year





Past History


Past Medical History: diabetes


Past Surgical History: No surgical history


Social history: no significant social history


Family history: no significant family history





Medications and Allergies


                                    Allergies











Allergy/AdvReac Type Severity Reaction Status Date / Time


 


No Known Allergies Allergy   Unverified 04/25/20 17:21











                                Home Medications











 Medication  Instructions  Recorded  Confirmed  Last Taken  Type


 


Insulin Glargine,Hum.rec.anlog 24 units SUB-Q HS #1 vial 01/28/19 04/23/20 

Unknown Rx





[Lantus]     


 


Insulin Lispro [HumaLOG VIAL] 0 units SQ AC #1 vial 01/28/19 04/23/20 Unknown Rx


 


Ondansetron [Zofran ODT TAB] 8 mg PO Q8H PRN #90 tab.rapdis 01/28/19 04/23/20 

Unknown Rx


 


Kristen Root [Kristen] 250 mg PO QID PRN #30 capsule 02/26/20 04/23/20 Unknown Rx


 


Pantoprazole [Protonix TAB] 40 mg PO QDAY #30 tablet 02/26/20 04/23/20 Unknown 

Rx


 


Promethazine [Phenergan] 25 mg LA Q6HR PRN #15 supp.rect 02/26/20 04/23/20 

Unknown Rx


 


Cyclobenzaprine [Flexeril] 10 mg PO BID PRN #12 tablet 04/15/20 04/23/20 Unknown

 Rx


 


Naproxen [EC-Naproxen] 500 mg PO BID PRN #20 tablet. 04/15/20 04/23/20 Unknown

Rx











Active Meds: 


Active Medications





Dextrose (D50w (25gm) Syringe)  0 ml IV Q30MIN PRN; Protocol


   PRN Reason: Hypoglycemia


Insulin Human Regular 100 (units/ Sodium Chloride)  100 mls @ 1 mls/hr IV TITR 

JADEN; Protocol


   Last Titration: 08/10/20 10:29 Dose:  7 units/hr, 7 mls/hr


   Documented by: 


Sodium Chloride (Nacl 0.9% 1000 Ml)  1,000 mls @ 150 mls/hr IV AS DIRECT JADEN


   Last Admin: 08/10/20 10:11 Dose:  150 mls/hr


   Documented by: 











Review of Systems


All systems: negative





Exam





- Constitutional


Vitals: 


                                        











Temp Pulse Resp BP Pulse Ox


 


 98.6 F   105 H  18   108/43   99 


 


 08/09/20 23:41  08/09/20 23:41  08/10/20 07:57  08/10/20 10:30  08/10/20 10:30











General appearance: Present: no acute distress, well-nourished





- EENT


Eyes: Present: PERRL


ENT: hearing intact, clear oral mucosa





- Neck


Neck: Present: supple, normal ROM





- Respiratory


Respiratory effort: normal


Respiratory: bilateral: CTA





- Cardiovascular


Heart Sounds: Present: S1 & S2.  Absent: rub, click





- Extremities


Extremities: pulses symmetrical, No edema


Peripheral Pulses: within normal limits





- Abdominal


General gastrointestinal: Present: soft, non-tender, non-distended, normal bowel

sounds


Male genitourinary: Present: normal





- Integumentary


Integumentary: Present: clear, warm, dry





- Musculoskeletal


Musculoskeletal: gait normal, strength equal bilaterally





- Psychiatric


Psychiatric: appropriate mood/affect, intact judgment & insight





- Neurologic


Neurologic: CNII-XII intact, moves all extremities





Results





- Labs


CBC & Chem 7: 


                                 08/10/20 06:51





                                 08/10/20 09:34


Labs: 


                             Laboratory Last Values











WBC  25.8 K/mm3 (4.5-11.0)  H  08/10/20  06:51    


 


RBC  4.33 M/mm3 (3.65-5.03)   08/10/20  06:51    


 


Hgb  13.0 gm/dl (11.8-15.2)   08/10/20  06:51    


 


Hct  41.3 % (35.5-45.6)   08/10/20  06:51    


 


MCV  95 fl (84-94)  H  08/10/20  06:51    


 


MCH  30 pg (28-32)   08/10/20  06:51    


 


MCHC  32 % (32-34)   08/10/20  06:51    


 


RDW  13.8 % (13.2-15.2)   08/10/20  06:51    


 


Plt Count  232 K/mm3 (140-440)   08/10/20  06:51    


 


Add Manual Diff  Complete   08/10/20  06:51    


 


Total Counted  100   08/10/20  06:51    


 


Seg Neuts % (Manual)  87.0 % (40.0-70.0)  H  08/10/20  06:51    


 


Band Neutrophils %  1.0 %  08/10/20  06:51    


 


Lymphocytes % (Manual)  6.0 % (13.4-35.0)  L  08/10/20  06:51    


 


Reactive Lymphs % (Man)  0 %  08/10/20  06:51    


 


Monocytes % (Manual)  6.0 % (0.0-7.3)   08/10/20  06:51    


 


Eosinophils % (Manual)  0 % (0.0-4.3)   08/10/20  06:51    


 


Basophils % (Manual)  0 % (0.0-1.8)   08/10/20  06:51    


 


Metamyelocytes %  0 %  08/10/20  06:51    


 


Myelocytes %  0 %  08/10/20  06:51    


 


Promyelocytes %  0 %  08/10/20  06:51    


 


Blast Cells %  0 %  08/10/20  06:51    


 


Nucleated RBC %  Not Reportable   08/10/20  06:51    


 


Seg Neutrophils # Man  22.4 K/mm3 (1.8-7.7)  H  08/10/20  06:51    


 


Band Neutrophils #  0.3 K/mm3  08/10/20  06:51    


 


Lymphocytes # (Manual)  1.5 K/mm3 (1.2-5.4)   08/10/20  06:51    


 


Abs React Lymphs (Man)  0.0 K/mm3  08/10/20  06:51    


 


Monocytes # (Manual)  1.5 K/mm3 (0.0-0.8)  H  08/10/20  06:51    


 


Eosinophils # (Manual)  0.0 K/mm3 (0.0-0.4)   08/10/20  06:51    


 


Basophils # (Manual)  0.0 K/mm3 (0.0-0.1)   08/10/20  06:51    


 


Metamyelocytes #  0.0 K/mm3  08/10/20  06:51    


 


Myelocytes #  0.0 K/mm3  08/10/20  06:51    


 


Promyelocytes #  0.0 K/mm3  08/10/20  06:51    


 


Blast Cells #  0.0 K/mm3  08/10/20  06:51    


 


WBC Morphology  Not Reportable   08/10/20  06:51    


 


Hypersegmented Neuts  Not Reportable   08/10/20  06:51    


 


Hyposegmented Neuts  Not Reportable   08/10/20  06:51    


 


Hypogranular Neuts  Not Reportable   08/10/20  06:51    


 


Smudge Cells  Not Reportable   08/10/20  06:51    


 


Toxic Granulation  Not Reportable   08/10/20  06:51    


 


Toxic Vacuolation  Not Reportable   08/10/20  06:51    


 


Dohle Bodies  Not Reportable   08/10/20  06:51    


 


Pelger-Huet Anomaly  Not Reportable   08/10/20  06:51    


 


Omid Rods  Not Reportable   08/10/20  06:51    


 


Platelet Estimate  Consistent w auto   08/10/20  06:51    


 


Clumped Platelets  Not Reportable   08/10/20  06:51    


 


Plt Clumps, EDTA  Not Reportable   08/10/20  06:51    


 


Large Platelets  Not Reportable   08/10/20  06:51    


 


Giant Platelets  Not Reportable   08/10/20  06:51    


 


Platelet Satelliting  Not Reportable   08/10/20  06:51    


 


Plt Morphology Comment  Not Reportable   08/10/20  06:51    


 


RBC Morphology  Not Reportable   08/10/20  06:51    


 


Dimorphic RBCs  Not Reportable   08/10/20  06:51    


 


Polychromasia  Not Reportable   08/10/20  06:51    


 


Hypochromasia  Not Reportable   08/10/20  06:51    


 


Poikilocytosis  1+   08/10/20  06:51    


 


Anisocytosis  1+   08/10/20  06:51    


 


Microcytosis  Not Reportable   08/10/20  06:51    


 


Macrocytosis  Not Reportable   08/10/20  06:51    


 


Spherocytes  Not Reportable   08/10/20  06:51    


 


Pappenheimer Bodies  Not Reportable   08/10/20  06:51    


 


Sickle Cells  Not Reportable   08/10/20  06:51    


 


Target Cells  Not Reportable   08/10/20  06:51    


 


Tear Drop Cells  Not Reportable   08/10/20  06:51    


 


Ovalocytes  Not Reportable   08/10/20  06:51    


 


Helmet Cells  Not Reportable   08/10/20  06:51    


 


Sparrow-[place] Bodies  Not Reportable   08/10/20  06:51    


 


Cabot Rings  Not Reportable   08/10/20  06:51    


 


Darnell Cells  1+   08/10/20  06:51    


 


Bite Cells  Not Reportable   08/10/20  06:51    


 


Crenated Cell  Not Reportable   08/10/20  06:51    


 


Elliptocytes  Not Reportable   08/10/20  06:51    


 


Acanthocytes (Spur)  Not Reportable   08/10/20  06:51    


 


Rouleaux  Not Reportable   08/10/20  06:51    


 


Hemoglobin C Crystals  Not Reportable   08/10/20  06:51    


 


Schistocytes  Not Reportable   08/10/20  06:51    


 


Malaria parasites  Not Reportable   08/10/20  06:51    


 


Remigio Bodies  Not Reportable   08/10/20  06:51    


 


Hem Pathologist Commnt  No   08/10/20  06:51    


 


Sodium  136 mmol/L (137-145)  L  08/10/20  09:34    


 


Potassium  4.6 mmol/L (3.6-5.0)   08/10/20  09:34    


 


Chloride  100.9 mmol/L ()   08/10/20  09:34    


 


Carbon Dioxide  13 mmol/L (22-30)  L  08/10/20  09:34    


 


Anion Gap  27 mmol/L  08/10/20  09:34    


 


BUN  21 mg/dL (9-20)  H  08/10/20  09:34    


 


Creatinine  1.2 mg/dL (0.8-1.3)   08/10/20  09:34    


 


Estimated GFR  > 60 ml/min  08/10/20  09:34    


 


BUN/Creatinine Ratio  18 %  08/10/20  09:34    


 


Glucose  397 mg/dL ()  H  08/10/20  09:34    


 


POC Glucose  378  ()  H  08/10/20  10:42    


 


Calcium  9.3 mg/dL (8.4-10.2)   08/10/20  09:34    


 


Phosphorus  6.00 mg/dL (2.5-4.5)  H D 08/10/20  06:51    


 


Magnesium  1.80 mg/dL (1.7-2.3)   08/10/20  06:51    


 


Total Creatine Kinase  275 units/L ()  H  08/10/20  03:12    


 


Salicylates  < 0.3 mg/dL (2.8-20.0)  L  08/10/20  03:12    


 


Acetaminophen  5.0 ug/mL (10.0-30.0)  L  08/10/20  03:12    


 


Plasma/Serum Alcohol  < 0.01 % (0-0.07)   08/10/20  03:12    














Assessment and Plan


Assessment and plan: 





DKA.  Patient will be placed on the DKA pathway and started on IV insulin drip. 

Continue to monitor BMP and anion gap closely.





Gastritis.  Continue Protonix daily





Medical noncompliance.  Patient will be counseled on importance of medications 

when stable.

## 2020-08-11 LAB
BASOPHILS # (AUTO): 0 K/MM3 (ref 0–0.1)
BASOPHILS NFR BLD AUTO: 0.2 % (ref 0–1.8)
BUN SERPL-MCNC: 11 MG/DL (ref 9–20)
BUN SERPL-MCNC: 13 MG/DL (ref 9–20)
BUN SERPL-MCNC: 13 MG/DL (ref 9–20)
BUN SERPL-MCNC: 15 MG/DL (ref 9–20)
BUN/CREAT SERPL: 12 %
BUN/CREAT SERPL: 13 %
BUN/CREAT SERPL: 14 %
BUN/CREAT SERPL: 15 %
CALCIUM SERPL-MCNC: 8.8 MG/DL (ref 8.4–10.2)
CALCIUM SERPL-MCNC: 8.9 MG/DL (ref 8.4–10.2)
CALCIUM SERPL-MCNC: 9 MG/DL (ref 8.4–10.2)
CALCIUM SERPL-MCNC: 9.1 MG/DL (ref 8.4–10.2)
EOSINOPHIL # BLD AUTO: 0 K/MM3 (ref 0–0.4)
EOSINOPHIL NFR BLD AUTO: 0.1 % (ref 0–4.3)
HCT VFR BLD CALC: 37.5 % (ref 35.5–45.6)
HEMOLYSIS INDEX: 10
HEMOLYSIS INDEX: 6
HEMOLYSIS INDEX: 7
HEMOLYSIS INDEX: 8
HGB BLD-MCNC: 12.1 GM/DL (ref 11.8–15.2)
LYMPHOCYTES # BLD AUTO: 1.3 K/MM3 (ref 1.2–5.4)
LYMPHOCYTES NFR BLD AUTO: 8.9 % (ref 13.4–35)
MCHC RBC AUTO-ENTMCNC: 32 % (ref 32–34)
MCV RBC AUTO: 94 FL (ref 84–94)
MONOCYTES # (AUTO): 1.1 K/MM3 (ref 0–0.8)
MONOCYTES % (AUTO): 7.6 % (ref 0–7.3)
PLATELET # BLD: 166 K/MM3 (ref 140–440)
RBC # BLD AUTO: 3.99 M/MM3 (ref 3.65–5.03)

## 2020-08-11 RX ADMIN — ENOXAPARIN SODIUM SCH MG: 100 INJECTION SUBCUTANEOUS at 21:55

## 2020-08-11 RX ADMIN — Medication SCH: at 10:08

## 2020-08-11 RX ADMIN — METOCLOPRAMIDE PRN MG: 5 INJECTION, SOLUTION INTRAMUSCULAR; INTRAVENOUS at 13:37

## 2020-08-11 RX ADMIN — INSULIN LISPRO SCH UNIT: 100 INJECTION, SOLUTION INTRAVENOUS; SUBCUTANEOUS at 16:00

## 2020-08-11 RX ADMIN — METOCLOPRAMIDE PRN MG: 5 INJECTION, SOLUTION INTRAMUSCULAR; INTRAVENOUS at 02:41

## 2020-08-11 RX ADMIN — INSULIN LISPRO SCH: 100 INJECTION, SOLUTION INTRAVENOUS; SUBCUTANEOUS at 10:29

## 2020-08-11 RX ADMIN — Medication SCH ML: at 21:56

## 2020-08-11 RX ADMIN — INSULIN LISPRO SCH UNIT: 100 INJECTION, SOLUTION INTRAVENOUS; SUBCUTANEOUS at 21:55

## 2020-08-11 NOTE — EVENT NOTE
Date: 08/11/20





Anion Gap has closed.  Needs to have long acting insulin started and to be fed. 

Does not require ICU care anymore.  Will sign off.

## 2020-08-12 LAB
BUN SERPL-MCNC: 10 MG/DL (ref 9–20)
BUN/CREAT SERPL: 13 %
CALCIUM SERPL-MCNC: 8.7 MG/DL (ref 8.4–10.2)
HEMOLYSIS INDEX: 15

## 2020-08-12 RX ADMIN — ENOXAPARIN SODIUM SCH MG: 100 INJECTION SUBCUTANEOUS at 23:16

## 2020-08-12 RX ADMIN — Medication SCH ML: at 23:18

## 2020-08-12 RX ADMIN — INSULIN LISPRO SCH UNIT: 100 INJECTION, SOLUTION INTRAVENOUS; SUBCUTANEOUS at 23:17

## 2020-08-12 RX ADMIN — AMOXICILLIN AND CLAVULANATE POTASSIUM SCH EACH: 875; 125 TABLET, FILM COATED ORAL at 13:02

## 2020-08-12 RX ADMIN — INSULIN GLARGINE SCH UNITS: 100 INJECTION, SOLUTION SUBCUTANEOUS at 10:05

## 2020-08-12 RX ADMIN — INSULIN LISPRO SCH: 100 INJECTION, SOLUTION INTRAVENOUS; SUBCUTANEOUS at 06:47

## 2020-08-12 RX ADMIN — INSULIN LISPRO SCH: 100 INJECTION, SOLUTION INTRAVENOUS; SUBCUTANEOUS at 17:25

## 2020-08-12 RX ADMIN — INSULIN LISPRO SCH: 100 INJECTION, SOLUTION INTRAVENOUS; SUBCUTANEOUS at 17:24

## 2020-08-12 RX ADMIN — INSULIN LISPRO SCH UNIT: 100 INJECTION, SOLUTION INTRAVENOUS; SUBCUTANEOUS at 10:07

## 2020-08-12 RX ADMIN — INSULIN LISPRO SCH UNIT: 100 INJECTION, SOLUTION INTRAVENOUS; SUBCUTANEOUS at 13:03

## 2020-08-12 RX ADMIN — Medication SCH ML: at 10:11

## 2020-08-12 RX ADMIN — AMOXICILLIN AND CLAVULANATE POTASSIUM SCH EACH: 875; 125 TABLET, FILM COATED ORAL at 23:17

## 2020-08-12 NOTE — PROGRESS NOTE
Assessment and Plan


Assessment and plan: 


29-year-old male with a medical history of DM type I on insulin presenting with 

chief complaint of nausea, vomiting abdominal pain.  In the ER, patient was 

found to have DKA and was started on insulin drip.  His labs showed leukocytosis

with a left shift.  Patient was started on antibiotics.





8/11.  Patient seen and examined at bedside this morning.  He feels much better.

 Anion gap is closed so the patient has been started on Lantus and lispro 

sliding scale.  Check hemoglobin A1c





8/12.  He feels better today.  He is on Lantus and lispro.  BMP ordered this 

morning.  Procalcitonin slightly elevated at 0.79.  Treat with empirical 

amoxicillin for GI coverage.  Continue to monitor blood glucose closely.  He is 

hemoglobin A1c 7.1.  Needs to follow-up with endocrinologist at discharge











- Patient Problems


(1) Acute nausea with nonbilious vomiting


Current Visit: Yes   Status: Acute   


Plan to address problem: 


Likely has infectious gastroenteritis given elevated procalcitonin.


Start amoxicillin clavulanic acid twice daily for 7 days


Needs endocrinology follow-up at discharge


Continue PPI








(2) DKA (diabetic ketoacidoses)


Current Visit: Yes   Status: Acute   


Plan to address problem: 


Now off insulin drip.


Continue Lantus and lispro


Patient takes 70/30.  30 units in a.m. and p.m. and a sliding scale at noon -

will resume at discharge


Follow-up endocrine at discharge








(3) Gastritis


Current Visit: No   Status: Acute   


Plan to address problem: 


Continue PPI.








(4) Uncontrolled diabetes mellitus


Current Visit: No   Status: Acute   


Plan to address problem: 


Management as per above








(5) DVT prophylaxis


Current Visit: Yes   Status: Acute   


Plan to address problem: 


Encourage ambulation








(6) Gastroenteritis


Current Visit: Yes   Status: Acute   


Plan to address problem: 


This has improved


Augmentin twice daily for 7 to 10 days








History


Interval history: 


See assessment and plan








Hospitalist Physical





- Constitutional


Vitals: 


                                        











Temp Pulse Resp BP Pulse Ox


 


 98.9 F   80   16   122/69   98 


 


 08/12/20 04:50  08/12/20 04:50  08/12/20 04:50  08/12/20 04:50  08/12/20 04:50











General appearance: Present: no acute distress, well-nourished





- EENT


Eyes: Present: PERRL





- Neck


Neck: Present: supple, normal ROM





- Respiratory


Respiratory effort: normal


Respiratory: bilateral: CTA





- Cardiovascular


Rhythm: regular


Heart Sounds: Present: S1 & S2





- Extremities


Extremities: no ischemia, No edema





- Abdominal


General gastrointestinal: soft, non-tender





- Integumentary


Integumentary: Present: clear





- Psychiatric


Psychiatric: appropriate mood/affect





- Neurologic


Neurologic: CNII-XII intact





Results





- Labs


CBC & Chem 7: 


                                 08/11/20 08:49





                                 08/12/20 08:01


Labs: 


                             Laboratory Last Values











WBC  15.0 K/mm3 (4.5-11.0)  H  08/11/20  08:49    


 


RBC  3.99 M/mm3 (3.65-5.03)   08/11/20  08:49    


 


Hgb  12.1 gm/dl (11.8-15.2)   08/11/20  08:49    


 


Hct  37.5 % (35.5-45.6)   08/11/20  08:49    


 


MCV  94 fl (84-94)   08/11/20  08:49    


 


MCH  30 pg (28-32)   08/11/20  08:49    


 


MCHC  32 % (32-34)   08/11/20  08:49    


 


RDW  13.1 % (13.2-15.2)  L  08/11/20  08:49    


 


Plt Count  166 K/mm3 (140-440)   08/11/20  08:49    


 


Lymph % (Auto)  8.9 % (13.4-35.0)  L  08/11/20  08:49    


 


Mono % (Auto)  7.6 % (0.0-7.3)  H  08/11/20  08:49    


 


Eos % (Auto)  0.1 % (0.0-4.3)   08/11/20  08:49    


 


Baso % (Auto)  0.2 % (0.0-1.8)   08/11/20  08:49    


 


Lymph #  1.3 K/mm3 (1.2-5.4)   08/11/20  08:49    


 


Mono #  1.1 K/mm3 (0.0-0.8)  H  08/11/20  08:49    


 


Eos #  0.0 K/mm3 (0.0-0.4)   08/11/20  08:49    


 


Baso #  0.0 K/mm3 (0.0-0.1)   08/11/20  08:49    


 


Add Manual Diff  Complete   08/10/20  06:51    


 


Total Counted  100   08/10/20  06:51    


 


Seg Neutrophils %  83.2 % (40.0-70.0)  H  08/11/20  08:49    


 


Seg Neuts % (Manual)  87.0 % (40.0-70.0)  H  08/10/20  06:51    


 


Band Neutrophils %  1.0 %  08/10/20  06:51    


 


Lymphocytes % (Manual)  6.0 % (13.4-35.0)  L  08/10/20  06:51    


 


Reactive Lymphs % (Man)  0 %  08/10/20  06:51    


 


Monocytes % (Manual)  6.0 % (0.0-7.3)   08/10/20  06:51    


 


Eosinophils % (Manual)  0 % (0.0-4.3)   08/10/20  06:51    


 


Basophils % (Manual)  0 % (0.0-1.8)   08/10/20  06:51    


 


Metamyelocytes %  0 %  08/10/20  06:51    


 


Myelocytes %  0 %  08/10/20  06:51    


 


Promyelocytes %  0 %  08/10/20  06:51    


 


Blast Cells %  0 %  08/10/20  06:51    


 


Nucleated RBC %  Not Reportable   08/10/20  06:51    


 


Seg Neutrophils #  12.5 K/mm3 (1.8-7.7)  H  08/11/20  08:49    


 


Seg Neutrophils # Man  22.4 K/mm3 (1.8-7.7)  H  08/10/20  06:51    


 


Band Neutrophils #  0.3 K/mm3  08/10/20  06:51    


 


Lymphocytes # (Manual)  1.5 K/mm3 (1.2-5.4)   08/10/20  06:51    


 


Abs React Lymphs (Man)  0.0 K/mm3  08/10/20  06:51    


 


Monocytes # (Manual)  1.5 K/mm3 (0.0-0.8)  H  08/10/20  06:51    


 


Eosinophils # (Manual)  0.0 K/mm3 (0.0-0.4)   08/10/20  06:51    


 


Basophils # (Manual)  0.0 K/mm3 (0.0-0.1)   08/10/20  06:51    


 


Metamyelocytes #  0.0 K/mm3  08/10/20  06:51    


 


Myelocytes #  0.0 K/mm3  08/10/20  06:51    


 


Promyelocytes #  0.0 K/mm3  08/10/20  06:51    


 


Blast Cells #  0.0 K/mm3  08/10/20  06:51    


 


WBC Morphology  Not Reportable   08/10/20  06:51    


 


Hypersegmented Neuts  Not Reportable   08/10/20  06:51    


 


Hyposegmented Neuts  Not Reportable   08/10/20  06:51    


 


Hypogranular Neuts  Not Reportable   08/10/20  06:51    


 


Smudge Cells  Not Reportable   08/10/20  06:51    


 


Toxic Granulation  Not Reportable   08/10/20  06:51    


 


Toxic Vacuolation  Not Reportable   08/10/20  06:51    


 


Dohle Bodies  Not Reportable   08/10/20  06:51    


 


Pelger-Huet Anomaly  Not Reportable   08/10/20  06:51    


 


Omid Rods  Not Reportable   08/10/20  06:51    


 


Platelet Estimate  Consistent w auto   08/10/20  06:51    


 


Clumped Platelets  Not Reportable   08/10/20  06:51    


 


Plt Clumps, EDTA  Not Reportable   08/10/20  06:51    


 


Large Platelets  Not Reportable   08/10/20  06:51    


 


Giant Platelets  Not Reportable   08/10/20  06:51    


 


Platelet Satelliting  Not Reportable   08/10/20  06:51    


 


Plt Morphology Comment  Not Reportable   08/10/20  06:51    


 


RBC Morphology  Not Reportable   08/10/20  06:51    


 


Dimorphic RBCs  Not Reportable   08/10/20  06:51    


 


Polychromasia  Not Reportable   08/10/20  06:51    


 


Hypochromasia  Not Reportable   08/10/20  06:51    


 


Poikilocytosis  1+   08/10/20  06:51    


 


Anisocytosis  1+   08/10/20  06:51    


 


Microcytosis  Not Reportable   08/10/20  06:51    


 


Macrocytosis  Not Reportable   08/10/20  06:51    


 


Spherocytes  Not Reportable   08/10/20  06:51    


 


Pappenheimer Bodies  Not Reportable   08/10/20  06:51    


 


Sickle Cells  Not Reportable   08/10/20  06:51    


 


Target Cells  Not Reportable   08/10/20  06:51    


 


Tear Drop Cells  Not Reportable   08/10/20  06:51    


 


Ovalocytes  Not Reportable   08/10/20  06:51    


 


Helmet Cells  Not Reportable   08/10/20  06:51    


 


Spar[place] Bodies  Not Reportable   08/10/20  06:51    


 


Cabot Rings  Not Reportable   08/10/20  06:51    


 


[place] Cells  1+   08/10/20  06:51    


 


Bite Cells  Not Reportable   08/10/20  06:51    


 


Crenated Cell  Not Reportable   08/10/20  06:51    


 


Elliptocytes  Not Reportable   08/10/20  06:51    


 


Acanthocytes (Spur)  Not Reportable   08/10/20  06:51    


 


Rouleaux  Not Reportable   08/10/20  06:51    


 


Hemoglobin C Crystals  Not Reportable   08/10/20  06:51    


 


Schistocytes  Not Reportable   08/10/20  06:51    


 


Malaria parasites  Not Reportable   08/10/20  06:51    


 


Remigio Bodies  Not Reportable   08/10/20  06:51    


 


Hem Pathologist Commnt  No   08/10/20  06:51    


 


Sodium  138 mmol/L (137-145)   08/12/20  08:01    


 


Potassium  3.6 mmol/L (3.6-5.0)   08/12/20  08:01    


 


Chloride  102.4 mmol/L ()   08/12/20  08:01    


 


Carbon Dioxide  21 mmol/L (22-30)  L  08/12/20  08:01    


 


Anion Gap  18 mmol/L  08/12/20  08:01    


 


BUN  10 mg/dL (9-20)   08/12/20  08:01    


 


Creatinine  0.8 mg/dL (0.8-1.3)   08/12/20  08:01    


 


Estimated GFR  > 60 ml/min  08/12/20  08:01    


 


BUN/Creatinine Ratio  13 %  08/12/20  08:01    


 


Glucose  209 mg/dL ()  H  08/12/20  08:01    


 


POC Glucose  217  ()  H  08/11/20  21:30    


 


Hemoglobin A1c  7.1 % (4-6)  H  08/12/20  05:48    


 


Calcium  8.7 mg/dL (8.4-10.2)   08/12/20  08:01    


 


Phosphorus  1.80 mg/dL (2.5-4.5)  L D 08/10/20  12:51    


 


Magnesium  1.70 mg/dL (1.7-2.3)   08/10/20  12:51    


 


Total Creatine Kinase  275 units/L ()  H  08/10/20  03:12    


 


Procalcitonin  0.79 ng/mL (<0.15)   08/12/20  05:48    


 


Salicylates  < 0.3 mg/dL (2.8-20.0)  L  08/10/20  03:12    


 


Acetaminophen  5.0 ug/mL (10.0-30.0)  L  08/10/20  03:12    


 


Plasma/Serum Alcohol  < 0.01 % (0-0.07)   08/10/20  03:12    











Microbiology: 


Microbiology





08/10/20 09:34   Peripheral/Venous   Blood Culture - Preliminary


                            NO GROWTH AFTER 48 HOURS


08/10/20 09:34   Peripheral/Venous   Blood Culture - Preliminary


                            NO GROWTH AFTER 48 HOURS








Peña/IV: 


                                        





Voiding Method                   Toilet


IV Catheter Type [Left Hand]     INT / Saline Lock


IV Catheter Type [Right          INT / Saline Lock


Antecubital]                     











Active Medications





- Current Medications


Current Medications: 














Generic Name Dose Route Start Last Admin





  Trade Name Freq  PRN Reason Stop Dose Admin


 


Acetaminophen  650 mg  08/10/20 11:22  08/11/20 02:41





  Tylenol  PO   650 mg





  Q4H PRN   Administration





  Pain MILD(1-3)/Fever >100.5/HA  


 


Amoxicillin/Clavulanate Potassium  1 each  08/12/20 12:00 





  Augmentin 875 Mg  PO  





  Q12HR JADEN  


 


Dextrose  50 ml  08/11/20 10:00 





  D50w (25gm) Syringe  IV  





  Q30MIN PRN  





  Hypoglycemia  





  Protocol  


 


Enoxaparin Sodium  40 mg  08/10/20 22:00  08/11/20 21:55





  Enoxaparin  SUB-Q   40 mg





  QDAY@2200 JADEN   Administration


 


Sodium Chloride  1,000 mls @ 150 mls/hr  08/10/20 10:00  08/10/20 14:09





  Nacl 0.9% 1000 Ml  IV   0 mls/hr





  AS DIRECT JADEN   Infusion


 


Potassium Chloride/Dextrose/Sod Cl  20 meq in 1,000 mls @ 125 mls/hr  08/10/20 

14:10  08/10/20 22:09





  D5w/0.45% Nacl/Kcl 20 Meq  IV   125 mls/hr





  AS DIRECT JADEN   Administration


 


Insulin Glargine  30 units  08/12/20 08:00  08/12/20 10:05





  Lantus  SUB-Q   30 units





  QAMDIAB JADEN   Administration


 


Insulin Human Lispro  4 unit  08/12/20 11:30 





  Humalog  SUB-Q  





  [facility] JADEN  


 


Insulin Human Lispro  0 unit  08/12/20 08:00  08/12/20 10:07





  Humalog  SUB-Q   4 unit





  [facility] JADEN   Administration





  Protocol  


 


Metoclopramide HCl  10 mg  08/10/20 19:41  08/11/20 13:37





  Reglan  IV   10 mg





  Q6H PRN   Administration





  Nausea And Vomiting  


 


Ondansetron HCl  4 mg  08/10/20 11:22  08/10/20 14:15





  Zofran  IV   4 mg





  Q8H PRN   Administration





  Nausea And Vomiting  


 


Sodium Chloride  10 ml  08/10/20 22:00  08/12/20 10:11





  Sodium Chloride Flush Syringe 10 Ml  IV   10 ml





  BID JADEN   Administration


 


Sodium Chloride  10 ml  08/10/20 11:22 





  Sodium Chloride Flush Syringe 10 Ml  IV  





  PRN PRN  





  LINE FLUSH  














Nutrition/Malnutrition Assess





- Dietary Evaluation


Nutrition/Malnutrition Findings: 


                                        





Nutrition Notes                                            Start:  08/11/20 

12:47


Freq:                                                      Status: Active       




Protocol:                                                                       




 Document     08/11/20 12:47  LM  (Rec: 08/11/20 12:49  LM  [id number])


 Nutrition Notes


     Need for Assessment generated from:         MD Order


     Initial or Follow up                        Brief Note


     Current Diagnosis                           Diabetes


     Other Pertinent Diagnosis                   DKA


     Subjective/Other Information                MD consult for diet education.


                                                 Pt is in the ED.


 Nutrition Intervention


     Follow-Up By:                               08/13/20


     Additional Comments                         F/U for diet education

## 2020-08-13 VITALS — SYSTOLIC BLOOD PRESSURE: 136 MMHG | DIASTOLIC BLOOD PRESSURE: 84 MMHG

## 2020-08-13 RX ADMIN — INSULIN LISPRO SCH: 100 INJECTION, SOLUTION INTRAVENOUS; SUBCUTANEOUS at 09:36

## 2020-08-13 RX ADMIN — INSULIN LISPRO SCH UNIT: 100 INJECTION, SOLUTION INTRAVENOUS; SUBCUTANEOUS at 13:04

## 2020-08-13 RX ADMIN — Medication SCH ML: at 09:36

## 2020-08-13 RX ADMIN — INSULIN GLARGINE SCH UNITS: 100 INJECTION, SOLUTION SUBCUTANEOUS at 09:35

## 2020-08-13 RX ADMIN — AMOXICILLIN AND CLAVULANATE POTASSIUM SCH EACH: 875; 125 TABLET, FILM COATED ORAL at 09:36

## 2020-08-13 RX ADMIN — INSULIN LISPRO SCH: 100 INJECTION, SOLUTION INTRAVENOUS; SUBCUTANEOUS at 11:07

## 2020-08-13 NOTE — DISCHARGE SUMMARY
Providers





- Providers


Date of Admission: 


08/10/20 09:05





Date of discharge: 08/13/20


Attending physician: 


HELADIO DOUGHERTY





                                        





08/11/20 09:30


Consult to Dietitian/Nutrition [CONS] Routine 


   Physician Instructions: 


   Reason For Exam: 


   Reason for Consult: Diet education











Primary care physician: 


PRIMARY CARE MD








Hospitalization


Condition: Stable


Hospital course: 


29-year-old male with a medical history of DM type I on insulin presenting with 

chief complaint of nausea, vomiting abdominal pain.  In the ER, patient was 

found to have DKA and was started on insulin drip.  His labs showed leukocytosis

 with a left shift.  Patient was started on antibiotics.





8/11.  Patient seen and examined at bedside this morning.  He feels much better.

  Anion gap is closed so the patient has been started on Lantus and lispro 

sliding scale.  Check hemoglobin A1c





8/12.  He feels better today.  He is on Lantus and lispro.  BMP ordered this 

morning.  Procalcitonin slightly elevated at 0.79.  Treat with empirical 

amoxicillin for GI coverage.  Continue to monitor blood glucose closely.  He is 

hemoglobin A1c 7.1.  Needs to follow-up with endocrinologist at discharge.





8/13.  Patient seen and examined at bedside this morning.  He feels much better 

today.  Had an episode of hypoglycemia today .  I will discharge him on his 

usual home dose of insulin and have him follow-up with his pcp/endocrinologist. 

 He will continue to monitor his blood glucose closely.  He will also be on 

Augmentin for 7 days.  He is currently stable to be discharged today








Disposition: DC-01 TO HOME OR SELFCARE





- Discharge Diagnoses


(1) Acute nausea with nonbilious vomiting


Status: Acute   





(2) DKA (diabetic ketoacidoses)


Status: Acute   





(3) Gastritis


Status: Acute   





(4) Uncontrolled diabetes mellitus


Status: Acute   





(5) DVT prophylaxis


Status: Acute   





(6) Gastroenteritis


Status: Acute   





Core Measure Documentation





- Palliative Care


Palliative Care/ Comfort Measures: Not Applicable





- Core Measures


Any of the following diagnoses?: none





Exam





- Constitutional


Vitals: 


                                        











Temp Pulse Resp BP Pulse Ox


 


 97.4 F L  73   18   110/46   97 


 


 08/13/20 04:51  08/13/20 04:51  08/13/20 04:51  08/13/20 04:51  08/13/20 04:51











General appearance: Present: no acute distress, well-nourished





- EENT


Eyes: Present: PERRL


ENT: hearing intact, clear oral mucosa





- Neck


Neck: Present: supple, normal ROM





- Respiratory


Respiratory effort: normal


Respiratory: bilateral: CTA





- Cardiovascular


Heart Sounds: Present: S1 & S2.  Absent: rub, click





- Extremities


Extremities: pulses symmetrical, No edema


Peripheral Pulses: within normal limits





- Abdominal


General gastrointestinal: Present: soft, non-tender, non-distended, normal bowel

 sounds


Male genitourinary: Present: normal





- Integumentary


Integumentary: Present: clear, warm, dry





- Musculoskeletal


Musculoskeletal: gait normal, strength equal bilaterally





- Psychiatric


Psychiatric: appropriate mood/affect, intact judgment & insight





- Neurologic


Neurologic: CNII-XII intact, moves all extremities





Plan


Diet: diabetic


Follow up with: 


PRIMARY CARE,MD [Primary Care Provider] - 3-5 Days


Prescriptions: 


Amoxicillin/K Clav Tab [Augmentin 875MG TAB] 1 each PO Q12HR #12 tablet

## 2020-12-01 ENCOUNTER — HOSPITAL ENCOUNTER (EMERGENCY)
Dept: HOSPITAL 5 - ED | Age: 30
Discharge: HOME | End: 2020-12-01
Payer: SELF-PAY

## 2020-12-01 VITALS — SYSTOLIC BLOOD PRESSURE: 125 MMHG | DIASTOLIC BLOOD PRESSURE: 70 MMHG

## 2020-12-01 DIAGNOSIS — K31.84: ICD-10-CM

## 2020-12-01 DIAGNOSIS — R11.2: ICD-10-CM

## 2020-12-01 DIAGNOSIS — R19.7: ICD-10-CM

## 2020-12-01 DIAGNOSIS — J45.909: ICD-10-CM

## 2020-12-01 DIAGNOSIS — Z79.4: ICD-10-CM

## 2020-12-01 DIAGNOSIS — Z79.899: ICD-10-CM

## 2020-12-01 DIAGNOSIS — R10.33: ICD-10-CM

## 2020-12-01 DIAGNOSIS — I50.9: ICD-10-CM

## 2020-12-01 DIAGNOSIS — E10.43: Primary | ICD-10-CM

## 2020-12-01 LAB
ALBUMIN SERPL-MCNC: 4.4 G/DL (ref 3.9–5)
ALT SERPL-CCNC: 34 UNITS/L (ref 7–56)
BASOPHILS # (AUTO): 0 K/MM3 (ref 0–0.1)
BASOPHILS NFR BLD AUTO: 0.4 % (ref 0–1.8)
BUN SERPL-MCNC: 17 MG/DL (ref 9–20)
BUN/CREAT SERPL: 15 %
CALCIUM SERPL-MCNC: 9.8 MG/DL (ref 8.4–10.2)
EOSINOPHIL # BLD AUTO: 0 K/MM3 (ref 0–0.4)
EOSINOPHIL NFR BLD AUTO: 0.4 % (ref 0–4.3)
HCT VFR BLD CALC: 42.7 % (ref 35.5–45.6)
HEMOLYSIS INDEX: 20
HGB BLD-MCNC: 14.3 GM/DL (ref 11.8–15.2)
LYMPHOCYTES # BLD AUTO: 1.9 K/MM3 (ref 1.2–5.4)
LYMPHOCYTES NFR BLD AUTO: 28.9 % (ref 13.4–35)
MCHC RBC AUTO-ENTMCNC: 33 % (ref 32–34)
MCV RBC AUTO: 90 FL (ref 84–94)
MONOCYTES # (AUTO): 0.5 K/MM3 (ref 0–0.8)
MONOCYTES % (AUTO): 8.1 % (ref 0–7.3)
PLATELET # BLD: 197 K/MM3 (ref 140–440)
RBC # BLD AUTO: 4.74 M/MM3 (ref 3.65–5.03)

## 2020-12-01 PROCEDURE — 74022 RADEX COMPL AQT ABD SERIES: CPT

## 2020-12-01 PROCEDURE — 96361 HYDRATE IV INFUSION ADD-ON: CPT

## 2020-12-01 PROCEDURE — 96374 THER/PROPH/DIAG INJ IV PUSH: CPT

## 2020-12-01 PROCEDURE — 85025 COMPLETE CBC W/AUTO DIFF WBC: CPT

## 2020-12-01 PROCEDURE — 80053 COMPREHEN METABOLIC PANEL: CPT

## 2020-12-01 PROCEDURE — 96376 TX/PRO/DX INJ SAME DRUG ADON: CPT

## 2020-12-01 PROCEDURE — 99285 EMERGENCY DEPT VISIT HI MDM: CPT

## 2020-12-01 PROCEDURE — 74177 CT ABD & PELVIS W/CONTRAST: CPT

## 2020-12-01 PROCEDURE — 82010 KETONE BODYS QUAN: CPT

## 2020-12-01 PROCEDURE — 82962 GLUCOSE BLOOD TEST: CPT

## 2020-12-01 PROCEDURE — 83690 ASSAY OF LIPASE: CPT

## 2020-12-01 PROCEDURE — 36415 COLL VENOUS BLD VENIPUNCTURE: CPT

## 2020-12-01 PROCEDURE — 96375 TX/PRO/DX INJ NEW DRUG ADDON: CPT

## 2020-12-01 NOTE — CAT SCAN REPORT
CT abdomen pelvis w con



INDICATION:

Severe abd pain, vomiting, diarrhea. 



COMPARISON: August 10 2



TECHNIQUE: Abdominal and pelvic CT exam performed. All CT scans at this location are performed using 
CT dose reduction for ALARA by means of automated exposure control.



FINDINGS:



CT ABDOMEN and PELVIS:

Lung Bases: No significant abnormality.

Liver: No significant abnormality.

Biliary: No significant abnormality.

Spleen: No significant abnormality.

Pancreas: No significant abnormality.

Adrenals: No significant abnormality.

Kidneys: No significant abnormality.

Lymphatics: No lymphadenopathy.

Vasculature: No significant abnormality. 

Bowel: No significant abnormality.  Normal appendix.

Pelvis: No significant abnormality.

Osseous Structures: No aggressive osseous lesion.

Additional Findings: None



IMPRESSION:

1. No acute abnormality of the abdomen or pelvis.



Signer Name: Edouard Vasquez MD 

Signed: 12/1/2020 6:19 PM

Workstation Name: [id number]

## 2020-12-01 NOTE — EMERGENCY DEPARTMENT REPORT
<DURGA ROMANO - Last Filed: 12/01/20 17:29>





ED N/V/D HPI





- General


Chief complaint: Nausea/Vomiting/Diarrhea


Stated complaint: ABD PAIN/VOMITING


Time Seen by Provider: 12/01/20 12:38


Source: patient


Mode of arrival: Wheelchair


Limitations: No Limitations





- History of Present Illness


Initial comments: 


22-year-old male with a past medical history of type 1 diabetes currently on 

insulin and a history of gastroparesis presents to the ER today complaint of 

severe periumbilical abdominal pain with associated nausea, vomiting and 

diarrhea. Patient states that his symptoms started this morning. Patient states 

that he vomited "a lot" since this morning.  He also reports that he has had 

diarrhea "all morning". Patient denies eating any bad foods yesterday or this 

morning. He denies being on any antibiotics recently. He denies any recent 

travel out of the country. He denies any ill contacts. He denies any history of 

known peptic ulcer disease, alcohol abuse, or tobacco use. He reports no 

associated chest pain or shortness of breath. He denies any fever or chills. 

Patient states that he has been compliant with taking his diabetic medication. 

He also states that he used to be on Reglan but he ran out about 2 weeks ago.


MD complaint: nausea, vomiting, diarrhea, abdominal pain


-: Sudden (This morning)





- Related Data


                                Home Medications











 Medication  Instructions  Recorded  Confirmed  Last Taken


 


Insulin NPH Hum/Reg Insulin Hm 30 units SQ AC 08/10/20 08/10/20 Unknown





[Novolin 70-30 100 Unit/ml Vial]    


 


Metoclopramide [Reglan TAB] 10 mg PO QID PRN 08/10/20 08/10/20 Unknown








                                  Previous Rx's











 Medication  Instructions  Recorded  Last Taken  Type


 


Amoxicillin/K Clav Tab [Augmentin 1 each PO Q12HR #12 tablet 08/13/20 Unknown Rx





875MG TAB]    


 


Dicyclomine [Bentyl] 10 mg PO QID #20 capsule 12/01/20 Unknown Rx


 


Ondansetron [Zofran Odt] 4 mg PO Q8HR PRN #15 tab.rapdis 12/01/20 Unknown Rx











                                    Allergies











Allergy/AdvReac Type Severity Reaction Status Date / Time


 


No Known Allergies Allergy   Unverified 04/25/20 17:21














ED Review of Systems


Comment: All other systems reviewed and negative


Constitutional: denies: chills, fever


Eyes: denies: eye pain, eye discharge, vision change


ENT: denies: ear pain, throat pain


Respiratory: denies: cough, shortness of breath, wheezing


Cardiovascular: denies: chest pain, palpitations


Gastrointestinal: abdominal pain, nausea, vomiting, diarrhea


Genitourinary: denies: urgency, dysuria


Musculoskeletal: denies: back pain, joint swelling, arthralgia


Skin: denies: rash, lesions


Neurological: denies: headache, weakness, paresthesias


Psychiatric: denies: anxiety, depression


Hematological/Lymphatic: denies: easy bleeding, easy bruising





ED Past Medical Hx





- Past Medical History


Previous Medical History?: Yes


Hx Congestive Heart Failure: Yes


Hx Diabetes: Yes


Hx Asthma: Yes


Hx COPD: No


Hx HIV: No


Additional medical history: Gastroparesis





- Surgical History


Past Surgical History?: No


Hx Open Heart Surgery: No


Hx Cholecystectomy: No


Hx Appendectomy: No


Hx Breast Surgery: No





- Social History


Smoking Status: Never Smoker


Substance Use Type: None





- Medications


Home Medications: 


                                Home Medications











 Medication  Instructions  Recorded  Confirmed  Last Taken  Type


 


Insulin NPH Hum/Reg Insulin Hm 30 units SQ AC 08/10/20 08/10/20 Unknown History





[Novolin 70-30 100 Unit/ml Vial]     


 


Metoclopramide [Reglan TAB] 10 mg PO QID PRN 08/10/20 08/10/20 Unknown History


 


Amoxicillin/K Clav Tab [Augmentin 1 each PO Q12HR #12 tablet 08/13/20  Unknown 

Rx





875MG TAB]     


 


Dicyclomine [Bentyl] 10 mg PO QID #20 capsule 12/01/20  Unknown Rx


 


Ondansetron [Zofran Odt] 4 mg PO Q8HR PRN #15 tab.rapdis 12/01/20  Unknown Rx














ED Physical Exam





- General


Limitations: No Limitations


General appearance: alert, in distress (Patient appears to be in pain distress, 

holding emesis bag is full of emesis.)





- Head


Head exam: Present: atraumatic, normocephalic, normal inspection





- Eye


Eye exam: Present: normal appearance





- Respiratory


Respiratory exam: Present: normal lung sounds bilaterally.  Absent: respiratory 

distress





- Cardiovascular


Cardiovascular Exam: Present: regular rate, normal rhythm, normal heart sounds





- GI/Abdominal


GI/Abdominal exam: Present: soft, tenderness (Patient reports tenderness to 

palpation with light palpation of the abdomen mainly in the upper abdomen and 

epigastric area with voluntary guarding).  Absent: distended





- Back Exam


Back exam: Present: normal inspection, full ROM





- Neurological Exam


Neurological exam: Present: alert, oriented X3, CN II-XII intact





- Psychiatric


Psychiatric exam: Present: agitated, anxious





- Skin


Skin exam: Present: intact





ED Course





- Reevaluation(s)


Reevaluation #1: 





12/01/20 14:22


Patient states he is still not feeling better after reglan and morphine. He 

reports he is still nauseous though he has not vomitted since I last saw him amd

he still having abdominal pain. IV toradol, pepcid, zofran ordered. 


12/01/20 14:23





Reevaluation #2: 


Patient was sleeping when I walked in room. Easily arousable. When he woke up he

still complaining of nausea and abdominal pain. he appears uncomfortable but 

overall not toxic. Repeat Abdominal exam showed soft diffusely tender abdomen 

with voluntary guarding. patient had not had no vomiting since meds given in ED 

and no diarrhea. Pt wanted to try PO fluids and another round of zofran ordered.




12/01/20 16:48


Pt now states he took 2 sips of the water but does not think he can tolerate 

more. Patient wanting to be admitted. Discussed case with Dr Clayton, labs 

including xray unremarkable. Based on labs no indication for admission. 

Recommend doing CT abdomen and pelvis if patient still complaining of pain and 

trying IV ativan, benadryl or haldol to see if that might help with symptoms. 

And then re-eval after CT and meds. 


12/01/20 16:54


When I went to discuss plan with patient, he reported vomiting on the floor 

(there was small amt of non bloody emsis on the floor). 


12/01/20 16:55








ED Medical Decision Making





- Lab Data


Result diagrams: 


                                 12/01/20 09:31





                                 12/01/20 09:31





- Radiology Data


Radiology results: report reviewed





- Medical Decision Making


[id number]


Case/labs discussed with Dr Clayton; CT ordered and pending. 








ED Disposition


Clinical Impression: 


 Gastroparesis due to DM, Vomiting and diarrhea, Abdominal pain





Disposition: DC-01 TO HOME OR SELFCARE


Condition: Stable


Instructions:  Abdominal Pain, Adult, Nausea and Vomiting, Adult, Gastroparesis,

Diabetes Mellitus Type 2 in Adults (ED)


Additional Instructions: 


Increase your oral rehydration.  Take your medications as prescribed.





I have given you a referral for [facility] to follow-up regarding 

the abdominal pain, nausea and vomiting.





Return to the emergency department with any worsening of your symptoms, new or 

concerning symptoms not addressed during this current emergency department 

visit, or with any acute distress.


Prescriptions: 


Dicyclomine [Bentyl] 10 mg PO QID #20 capsule


Ondansetron [Zofran Odt] 4 mg PO Q8HR PRN #15 tab.rapdis


 PRN Reason: Nausea


Referrals: 


[facility] [Provider Group] - 3-5 Days


PRIMARY CARE,MD [Primary Care Provider] - 3-5 Days





<DEBORA CLAYTON - Last Filed: 12/01/20 19:13>





ED Review of Systems


ROS: 


Stated complaint: ABD PAIN/VOMITING


Other details as noted in HPI








ED Course





                                   Vital Signs











  12/01/20





  09:18


 


Temperature 98.3 F


 


Pulse Rate 70


 


Respiratory 17





Rate 


 


Blood Pressure 125/70


 


O2 Sat by Pulse 100





Oximetry 














- Reevaluation(s)


Reevaluation #4: 





12/01/20 19:11





                                   Lab Results











  12/01/20 12/01/20 12/01/20 Range/Units





  09:25 09:30 09:30 


 


WBC     (4.5-11.0)  K/mm3


 


RBC     (3.65-5.03)  M/mm3


 


Hgb     (11.8-15.2)  gm/dl


 


Hct     (35.5-45.6)  %


 


MCV     (84-94)  fl


 


MCH     (28-32)  pg


 


MCHC     (32-34)  %


 


RDW     (13.2-15.2)  %


 


Plt Count     (140-440)  K/mm3


 


Lymph % (Auto)     (13.4-35.0)  %


 


Mono % (Auto)     (0.0-7.3)  %


 


Eos % (Auto)     (0.0-4.3)  %


 


Baso % (Auto)     (0.0-1.8)  %


 


Lymph # (Auto)     (1.2-5.4)  K/mm3


 


Mono # (Auto)     (0.0-0.8)  K/mm3


 


Eos # (Auto)     (0.0-0.4)  K/mm3


 


Baso # (Auto)     (0.0-0.1)  K/mm3


 


Seg Neutrophils %     (40.0-70.0)  %


 


Seg Neutrophils #     (1.8-7.7)  K/mm3


 


Sodium     (137-145)  mmol/L


 


Potassium     (3.6-5.0)  mmol/L


 


Chloride     ()  mmol/L


 


Carbon Dioxide     (22-30)  mmol/L


 


Anion Gap     mmol/L


 


BUN     (9-20)  mg/dL


 


Creatinine     (0.8-1.3)  mg/dL


 


Estimated GFR     ml/min


 


BUN/Creatinine Ratio     %


 


Glucose     ()  mg/dL


 


POC Glucose  276 H    ()  mg/dL


 


Ketones Quantitative    Negative  (Negative)  


 


Calcium     (8.4-10.2)  mg/dL


 


Total Bilirubin     (0.1-1.2)  mg/dL


 


AST     (5-40)  units/L


 


ALT     (7-56)  units/L


 


Alkaline Phosphatase     ()  units/L


 


Total Protein     (6.3-8.2)  g/dL


 


Albumin     (3.9-5)  g/dL


 


Albumin/Globulin Ratio     %


 


Lipase   9 L   (13-60)  units/L














  12/01/20 12/01/20 Range/Units





  09:31 09:31 


 


WBC  6.7   (4.5-11.0)  K/mm3


 


RBC  4.74   (3.65-5.03)  M/mm3


 


Hgb  14.3   (11.8-15.2)  gm/dl


 


Hct  42.7   (35.5-45.6)  %


 


MCV  90   (84-94)  fl


 


MCH  30   (28-32)  pg


 


MCHC  33   (32-34)  %


 


RDW  12.5 L   (13.2-15.2)  %


 


Plt Count  197   (140-440)  K/mm3


 


Lymph % (Auto)  28.9   (13.4-35.0)  %


 


Mono % (Auto)  8.1 H   (0.0-7.3)  %


 


Eos % (Auto)  0.4   (0.0-4.3)  %


 


Baso % (Auto)  0.4   (0.0-1.8)  %


 


Lymph # (Auto)  1.9   (1.2-5.4)  K/mm3


 


Mono # (Auto)  0.5   (0.0-0.8)  K/mm3


 


Eos # (Auto)  0.0   (0.0-0.4)  K/mm3


 


Baso # (Auto)  0.0   (0.0-0.1)  K/mm3


 


Seg Neutrophils %  62.2   (40.0-70.0)  %


 


Seg Neutrophils #  4.1   (1.8-7.7)  K/mm3


 


Sodium   137  (137-145)  mmol/L


 


Potassium   4.2  (3.6-5.0)  mmol/L


 


Chloride   102.5  ()  mmol/L


 


Carbon Dioxide   28  (22-30)  mmol/L


 


Anion Gap   11  mmol/L


 


BUN   17  (9-20)  mg/dL


 


Creatinine   1.1  (0.8-1.3)  mg/dL


 


Estimated GFR   > 60  ml/min


 


BUN/Creatinine Ratio   15  %


 


Glucose   323 H  ()  mg/dL


 


POC Glucose    ()  mg/dL


 


Ketones Quantitative    (Negative)  


 


Calcium   9.8  (8.4-10.2)  mg/dL


 


Total Bilirubin   0.30  (0.1-1.2)  mg/dL


 


AST   30  (5-40)  units/L


 


ALT   34  (7-56)  units/L


 


Alkaline Phosphatase   107  ()  units/L


 


Total Protein   7.7  (6.3-8.2)  g/dL


 


Albumin   4.4  (3.9-5)  g/dL


 


Albumin/Globulin Ratio   1.3  %


 


Lipase    (13-60)  units/L














ED Medical Decision Making





- Lab Data


Result diagrams: 


                                 12/01/20 09:31





                                 12/01/20 09:31





- Radiology Data


Radiology results: report reviewed





CT abdomen pelvis w con INDICATION: Severe abd pain, vomiting, diarrhea. 

COMPARISON: August 10 2 TECHNIQUE: Abdominal and pelvic CT exam performed. All 

CT scans at this location are performed using CT dose reduction for ALARA by 

means of automated exposure control. FINDINGS: CT ABDOMEN and PELVIS: Lung 

Bases: No significant abnormality. Liver: No significant abnormality. Biliary: 

No significant abnormality. Spleen: No significant abnormality. Pancreas: No 

significant abnormality. Adrenals: No significant abnormality. Kidneys: No 

significant abnormality. Lymphatics: No lymphadenopathy. Vasculature: No 

significant abnormality. Bowel: No significant abnormality. Normal appendix. 

Pelvis: No significant abnormality. Osseous Structures: No aggressive osseous 

lesion. Additional Findings: None IMPRESSION: 1. No acute abnormality of the 

abdomen or pelvis.





- Medical Decision Making


I reviewed the labs, ED course, CT results, and I went to see the patient.  The 

patient's labs are mostly unremarkable except for hyperglycemia.  However there 

is no elevated anion gap and the patient does not appear in diabetic 

ketoacidosis.  Despite the nausea and vomiting, there is no significant 

electrolyte abnormalities or signs of systemic dehydration.  CT scan of the 

abdomen pelvis does not show any acute abdominal or pelvic pathology.  All this 

appears most consistent with diabetic gastroparesis but the patient has had in 

the past.  When I went to reevaluate the patient he was laying in the bed, 

resting comfortably, texting on his phone, and does not appear in any acute 

distress.  There is no emesis basin to be cleaned out and he does not appear to 

have vomited in the past 2 hours.  He was able to pass an oral challenge.  His 

vital signs have been reassuring throughout his ED course including being 

afebrile.  For all these reasons patient appears safe for discharge home at this

time.  He has been given a prescription for Zofran ODT, Bentyl, and outpatient 

referral for gastroenterology.


Critical Care Time: No


Critical care attestation.: 


If time is entered above; I have spent that time in minutes in the direct care 

of this critically ill patient, excluding procedure time.








ED Disposition


Is pt being admited?: No

## 2020-12-01 NOTE — XRAY REPORT
ABDOMEN 4 VIEW(S)



INDICATION / CLINICAL INFORMATION:

severe epigastric pain.



COMPARISON: 

CT 8/10/2020



FINDINGS:



TUBES / LINES: None.

BOWEL GAS PATTERN: There is a paucity of small bowel gas which limits evaluation of the small bowel g
as pattern. Accounting for this, no dilated loops of small bowel are seen. Colon is unremarkable. 

FREE AIR / EXTRALUMINAL GAS: None seen.



ADDITIONAL FINDINGS: There is no significant abnormality on the included chest radiograph.



IMPRESSION:

1. No radiographic evidence of acute abdomen.



Signer Name: Junior Umanzor MD 

Signed: 12/1/2020 2:45 PM

Workstation Name: [id number]

## 2022-05-10 NOTE — PROGRESS NOTE
Assessment and Plan


Assessment and plan: 


29-year-old male with a medical history of DM type I on insulin presenting with 

chief complaint of nausea, vomiting abdominal pain.  In the ER, patient was 

found to have DKA and was started on insulin drip.  His labs showed leukocytosis

with a left shift.  Patient was started on antibiotics.





8/11.  Patient seen and examined at bedside this morning.  He feels much better.

 Anion gap is closed so the patient has been started on Lantus and lispro 

sliding scale.  Check hemoglobin A1c











- Patient Problems


(1) Acute nausea with nonbilious vomiting


Current Visit: Yes   Status: Acute   


Plan to address problem: 


Likely from DKA-now off insulin drip.  Continue Lantus and sliding scale.


Needs endocrinology follow-up at discharge


Continue PPI








(2) DKA (diabetic ketoacidoses)


Current Visit: Yes   Status: Acute   


Plan to address problem: 


Now off insulin drip.


Lantus 40 units in a.m. and a sliding scale [high dose]


Patient takes 70/30.  30 units in a.m. and p.m. and a sliding scale at noon.


Patient is on empirical antibiotics for elevated white count -no obvious of 

infection seen at this time.  Elevated bicarb most likely from stress


Follow-up endocrine at discharge








(3) Gastritis


Current Visit: No   Status: Acute   


Plan to address problem: 


Continue PPI.








(4) Uncontrolled diabetes mellitus


Current Visit: No   Status: Acute   


Plan to address problem: 


Management as per above








(5) DVT prophylaxis


Current Visit: Yes   Status: Acute   


Plan to address problem: 


Encourage ambulation








History


Interval history: 


See assessment and plan








Hospitalist Physical





- Constitutional


Vitals: 


                                        











Temp Pulse Resp BP Pulse Ox


 


 98.8 F   93 H  18   123/77   100 


 


 08/11/20 05:00  08/11/20 05:00  08/10/20 07:57  08/11/20 07:00  08/11/20 07:00











General appearance: Present: no acute distress, well-nourished





- EENT


Eyes: Present: PERRL, EOM intact





- Neck


Neck: Present: supple, normal ROM





- Respiratory


Respiratory: bilateral: CTA





- Cardiovascular


Rhythm: regular


Heart Sounds: Present: S1 & S2





- Extremities


Extremities: no ischemia





- Abdominal


General gastrointestinal: soft, non-tender, distended





- Psychiatric


Psychiatric: appropriate mood/affect





- Neurologic


Neurologic: CNII-XII intact





Results





- Labs


CBC & Chem 7: 


                                 08/11/20 08:49





                                 08/11/20 06:52


Labs: 


                             Laboratory Last Values











WBC  15.0 K/mm3 (4.5-11.0)  H  08/11/20  08:49    


 


RBC  3.99 M/mm3 (3.65-5.03)   08/11/20  08:49    


 


Hgb  12.1 gm/dl (11.8-15.2)   08/11/20  08:49    


 


Hct  37.5 % (35.5-45.6)   08/11/20  08:49    


 


MCV  94 fl (84-94)   08/11/20  08:49    


 


MCH  30 pg (28-32)   08/11/20  08:49    


 


MCHC  32 % (32-34)   08/11/20  08:49    


 


RDW  13.1 % (13.2-15.2)  L  08/11/20  08:49    


 


Plt Count  166 K/mm3 (140-440)   08/11/20  08:49    


 


Lymph % (Auto)  8.9 % (13.4-35.0)  L  08/11/20  08:49    


 


Mono % (Auto)  7.6 % (0.0-7.3)  H  08/11/20  08:49    


 


Eos % (Auto)  0.1 % (0.0-4.3)   08/11/20  08:49    


 


Baso % (Auto)  0.2 % (0.0-1.8)   08/11/20  08:49    


 


Lymph #  1.3 K/mm3 (1.2-5.4)   08/11/20  08:49    


 


Mono #  1.1 K/mm3 (0.0-0.8)  H  08/11/20  08:49    


 


Eos #  0.0 K/mm3 (0.0-0.4)   08/11/20  08:49    


 


Baso #  0.0 K/mm3 (0.0-0.1)   08/11/20  08:49    


 


Add Manual Diff  Complete   08/10/20  06:51    


 


Total Counted  100   08/10/20  06:51    


 


Seg Neutrophils %  83.2 % (40.0-70.0)  H  08/11/20  08:49    


 


Seg Neuts % (Manual)  87.0 % (40.0-70.0)  H  08/10/20  06:51    


 


Band Neutrophils %  1.0 %  08/10/20  06:51    


 


Lymphocytes % (Manual)  6.0 % (13.4-35.0)  L  08/10/20  06:51    


 


Reactive Lymphs % (Man)  0 %  08/10/20  06:51    


 


Monocytes % (Manual)  6.0 % (0.0-7.3)   08/10/20  06:51    


 


Eosinophils % (Manual)  0 % (0.0-4.3)   08/10/20  06:51    


 


Basophils % (Manual)  0 % (0.0-1.8)   08/10/20  06:51    


 


Metamyelocytes %  0 %  08/10/20  06:51    


 


Myelocytes %  0 %  08/10/20  06:51    


 


Promyelocytes %  0 %  08/10/20  06:51    


 


Blast Cells %  0 %  08/10/20  06:51    


 


Nucleated RBC %  Not Reportable   08/10/20  06:51    


 


Seg Neutrophils #  12.5 K/mm3 (1.8-7.7)  H  08/11/20  08:49    


 


Seg Neutrophils # Man  22.4 K/mm3 (1.8-7.7)  H  08/10/20  06:51    


 


Band Neutrophils #  0.3 K/mm3  08/10/20  06:51    


 


Lymphocytes # (Manual)  1.5 K/mm3 (1.2-5.4)   08/10/20  06:51    


 


Abs React Lymphs (Man)  0.0 K/mm3  08/10/20  06:51    


 


Monocytes # (Manual)  1.5 K/mm3 (0.0-0.8)  H  08/10/20  06:51    


 


Eosinophils # (Manual)  0.0 K/mm3 (0.0-0.4)   08/10/20  06:51    


 


Basophils # (Manual)  0.0 K/mm3 (0.0-0.1)   08/10/20  06:51    


 


Metamyelocytes #  0.0 K/mm3  08/10/20  06:51    


 


Myelocytes #  0.0 K/mm3  08/10/20  06:51    


 


Promyelocytes #  0.0 K/mm3  08/10/20  06:51    


 


Blast Cells #  0.0 K/mm3  08/10/20  06:51    


 


WBC Morphology  Not Reportable   08/10/20  06:51    


 


Hypersegmented Neuts  Not Reportable   08/10/20  06:51    


 


Hyposegmented Neuts  Not Reportable   08/10/20  06:51    


 


Hypogranular Neuts  Not Reportable   08/10/20  06:51    


 


Smudge Cells  Not Reportable   08/10/20  06:51    


 


Toxic Granulation  Not Reportable   08/10/20  06:51    


 


Toxic Vacuolation  Not Reportable   08/10/20  06:51    


 


Dohle Bodies  Not Reportable   08/10/20  06:51    


 


Pelger-Huet Anomaly  Not Reportable   08/10/20  06:51    


 


Omid Rods  Not Reportable   08/10/20  06:51    


 


Platelet Estimate  Consistent w auto   08/10/20  06:51    


 


Clumped Platelets  Not Reportable   08/10/20  06:51    


 


Plt Clumps, EDTA  Not Reportable   08/10/20  06:51    


 


Large Platelets  Not Reportable   08/10/20  06:51    


 


Giant Platelets  Not Reportable   08/10/20  06:51    


 


Platelet Satelliting  Not Reportable   08/10/20  06:51    


 


Plt Morphology Comment  Not Reportable   08/10/20  06:51    


 


RBC Morphology  Not Reportable   08/10/20  06:51    


 


Dimorphic RBCs  Not Reportable   08/10/20  06:51    


 


Polychromasia  Not Reportable   08/10/20  06:51    


 


Hypochromasia  Not Reportable   08/10/20  06:51    


 


Poikilocytosis  1+   08/10/20  06:51    


 


Anisocytosis  1+   08/10/20  06:51    


 


Microcytosis  Not Reportable   08/10/20  06:51    


 


Macrocytosis  Not Reportable   08/10/20  06:51    


 


Spherocytes  Not Reportable   08/10/20  06:51    


 


Pappenheimer Bodies  Not Reportable   08/10/20  06:51    


 


Sickle Cells  Not Reportable   08/10/20  06:51    


 


Target Cells  Not Reportable   08/10/20  06:51    


 


Tear Drop Cells  Not Reportable   08/10/20  06:51    


 


Ovalocytes  Not Reportable   08/10/20  06:51    


 


Helmet Cells  Not Reportable   08/10/20  06:51    


 


[place] Bodies  Not Reportable   08/10/20  06:51    


 


Cabot Rings  Not Reportable   08/10/20  06:51    


 


Darnell Cells  1+   08/10/20  06:51    


 


Bite Cells  Not Reportable   08/10/20  06:51    


 


Crenated Cell  Not Reportable   08/10/20  06:51    


 


Elliptocytes  Not Reportable   08/10/20  06:51    


 


Acanthocytes (Spur)  Not Reportable   08/10/20  06:51    


 


Rouleaux  Not Reportable   08/10/20  06:51    


 


Hemoglobin C Crystals  Not Reportable   08/10/20  06:51    


 


Schistocytes  Not Reportable   08/10/20  06:51    


 


Malaria parasites  Not Reportable   08/10/20  06:51    


 


Remigio Bodies  Not Reportable   08/10/20  06:51    


 


Hem Pathologist Commnt  No   08/10/20  06:51    


 


Sodium  137 mmol/L (137-145)   08/11/20  06:52    


 


Potassium  3.6 mmol/L (3.6-5.0)   08/11/20  06:52    


 


Chloride  106.2 mmol/L ()   08/11/20  06:52    


 


Carbon Dioxide  21 mmol/L (22-30)  L  08/11/20  06:52    


 


Anion Gap  13 mmol/L  08/11/20  06:52    


 


BUN  13 mg/dL (9-20)   08/11/20  06:52    


 


Creatinine  0.9 mg/dL (0.8-1.3)   08/11/20  06:52    


 


Estimated GFR  > 60 ml/min  08/11/20  06:52    


 


BUN/Creatinine Ratio  14 %  08/11/20  06:52    


 


Glucose  151 mg/dL ()  H  08/11/20  06:52    


 


POC Glucose  149  ()  H  08/11/20  10:42    


 


Calcium  8.8 mg/dL (8.4-10.2)   08/11/20  06:52    


 


Phosphorus  1.80 mg/dL (2.5-4.5)  L D 08/10/20  12:51    


 


Magnesium  1.70 mg/dL (1.7-2.3)   08/10/20  12:51    


 


Total Creatine Kinase  275 units/L ()  H  08/10/20  03:12    


 


Salicylates  < 0.3 mg/dL (2.8-20.0)  L  08/10/20  03:12    


 


Acetaminophen  5.0 ug/mL (10.0-30.0)  L  08/10/20  03:12    


 


Plasma/Serum Alcohol  < 0.01 % (0-0.07)   08/10/20  03:12    











Microbiology: 


Microbiology





08/10/20 09:34   Peripheral/Venous   Blood Culture - Preliminary


                            NO GROWTH AFTER 24 HOURS


08/10/20 09:34   Peripheral/Venous   Blood Culture - Preliminary


                            NO GROWTH AFTER 24 HOURS











Active Medications





- Current Medications


Current Medications: 














Generic Name Dose Route Start Last Admin





  Trade Name Freq  PRN Reason Stop Dose Admin


 


Acetaminophen  650 mg  08/10/20 11:22  08/11/20 02:41





  Tylenol  PO   650 mg





  Q4H PRN   Administration





  Pain MILD(1-3)/Fever >100.5/HA  


 


Dextrose  50 ml  08/11/20 10:00 





  D50w (25gm) Syringe  IV  





  Q30MIN PRN  





  Hypoglycemia  





  Protocol  


 


Enoxaparin Sodium  40 mg  08/10/20 22:00  08/10/20 22:10





  Enoxaparin  SUB-Q   40 mg





  QDAY@2200 JADEN   Administration


 


Sodium Chloride  1,000 mls @ 150 mls/hr  08/10/20 10:00  08/10/20 14:09





  Nacl 0.9% 1000 Ml  IV   0 mls/hr





  AS DIRECT JADEN   Infusion


 


Potassium Chloride/Dextrose/Sod Cl  20 meq in 1,000 mls @ 125 mls/hr  08/10/20 

14:10  08/10/20 22:09





  D5w/0.45% Nacl/Kcl 20 Meq  IV   125 mls/hr





  AS DIRECT JADEN   Administration


 


Insulin Human Lispro  0 unit  08/11/20 10:00  08/11/20 10:29





  Humalog  SUB-Q   Not Given





  Q6H JADEN  





  Protocol  


 


Metoclopramide HCl  10 mg  08/10/20 19:41  08/11/20 02:41





  Reglan  IV   10 mg





  Q6H PRN   Administration





  Nausea And Vomiting  


 


Ondansetron HCl  4 mg  08/10/20 11:22  08/10/20 14:15





  Zofran  IV   4 mg





  Q8H PRN   Administration





  Nausea And Vomiting  


 


Sodium Chloride  10 ml  08/10/20 22:00  08/11/20 10:08





  Sodium Chloride Flush Syringe 10 Ml  IV   Not Given





  BID JADEN  


 


Sodium Chloride  10 ml  08/10/20 11:22 





  Sodium Chloride Flush Syringe 10 Ml  IV  





  PRN PRN  





  LINE FLUSH no